# Patient Record
Sex: MALE | Race: WHITE | NOT HISPANIC OR LATINO | URBAN - METROPOLITAN AREA
[De-identification: names, ages, dates, MRNs, and addresses within clinical notes are randomized per-mention and may not be internally consistent; named-entity substitution may affect disease eponyms.]

---

## 2018-02-20 ENCOUNTER — INPATIENT (INPATIENT)
Facility: HOSPITAL | Age: 63
LOS: 2 days | Discharge: HOME | End: 2018-02-23
Attending: INTERNAL MEDICINE | Admitting: INTERNAL MEDICINE
Payer: COMMERCIAL

## 2018-02-20 VITALS
WEIGHT: 179.9 LBS | HEIGHT: 68 IN | SYSTOLIC BLOOD PRESSURE: 124 MMHG | TEMPERATURE: 100 F | OXYGEN SATURATION: 100 % | DIASTOLIC BLOOD PRESSURE: 82 MMHG | RESPIRATION RATE: 17 BRPM | HEART RATE: 78 BPM

## 2018-02-20 DIAGNOSIS — C34.11 MALIGNANT NEOPLASM OF UPPER LOBE, RIGHT BRONCHUS OR LUNG: ICD-10-CM

## 2018-02-20 DIAGNOSIS — F17.210 NICOTINE DEPENDENCE, CIGARETTES, UNCOMPLICATED: ICD-10-CM

## 2018-02-20 DIAGNOSIS — R07.89 OTHER CHEST PAIN: ICD-10-CM

## 2018-02-20 DIAGNOSIS — I10 ESSENTIAL (PRIMARY) HYPERTENSION: ICD-10-CM

## 2018-02-20 DIAGNOSIS — J43.9 EMPHYSEMA, UNSPECIFIED: ICD-10-CM

## 2018-02-20 DIAGNOSIS — J95.811 POSTPROCEDURAL PNEUMOTHORAX: ICD-10-CM

## 2018-02-20 DIAGNOSIS — N17.9 ACUTE KIDNEY FAILURE, UNSPECIFIED: ICD-10-CM

## 2018-02-20 LAB
ALBUMIN SERPL ELPH-MCNC: 3.7 G/DL — SIGNIFICANT CHANGE UP (ref 3.3–5)
ALP SERPL-CCNC: 68 U/L — SIGNIFICANT CHANGE UP (ref 40–120)
ALT FLD-CCNC: 23 U/L — SIGNIFICANT CHANGE UP (ref 12–78)
ANION GAP SERPL CALC-SCNC: 5 MMOL/L — SIGNIFICANT CHANGE UP (ref 5–17)
APTT BLD: 28.8 SEC — SIGNIFICANT CHANGE UP (ref 27.5–37.4)
AST SERPL-CCNC: 16 U/L — SIGNIFICANT CHANGE UP (ref 15–37)
BASOPHILS # BLD AUTO: 0.1 K/UL — SIGNIFICANT CHANGE UP (ref 0–0.2)
BASOPHILS NFR BLD AUTO: 0.8 % — SIGNIFICANT CHANGE UP (ref 0–2)
BILIRUB SERPL-MCNC: 0.2 MG/DL — SIGNIFICANT CHANGE UP (ref 0.2–1.2)
BUN SERPL-MCNC: 19 MG/DL — SIGNIFICANT CHANGE UP (ref 7–23)
CALCIUM SERPL-MCNC: 9.3 MG/DL — SIGNIFICANT CHANGE UP (ref 8.5–10.1)
CHLORIDE SERPL-SCNC: 102 MMOL/L — SIGNIFICANT CHANGE UP (ref 96–108)
CK SERPL-CCNC: 67 U/L — SIGNIFICANT CHANGE UP (ref 26–308)
CO2 SERPL-SCNC: 33 MMOL/L — HIGH (ref 22–31)
CREAT SERPL-MCNC: 1.35 MG/DL — HIGH (ref 0.5–1.3)
D DIMER BLD IA.RAPID-MCNC: 208 NG/ML DDU — SIGNIFICANT CHANGE UP
EOSINOPHIL # BLD AUTO: 0.2 K/UL — SIGNIFICANT CHANGE UP (ref 0–0.5)
EOSINOPHIL NFR BLD AUTO: 1.8 % — SIGNIFICANT CHANGE UP (ref 0–6)
GLUCOSE SERPL-MCNC: 99 MG/DL — SIGNIFICANT CHANGE UP (ref 70–99)
HCT VFR BLD CALC: 39.5 % — SIGNIFICANT CHANGE UP (ref 39–50)
HGB BLD-MCNC: 13.2 G/DL — SIGNIFICANT CHANGE UP (ref 13–17)
INR BLD: 1.17 RATIO — HIGH (ref 0.88–1.16)
LYMPHOCYTES # BLD AUTO: 2.5 K/UL — SIGNIFICANT CHANGE UP (ref 1–3.3)
LYMPHOCYTES # BLD AUTO: 24.6 % — SIGNIFICANT CHANGE UP (ref 13–44)
MCHC RBC-ENTMCNC: 29.4 PG — SIGNIFICANT CHANGE UP (ref 27–34)
MCHC RBC-ENTMCNC: 33.4 GM/DL — SIGNIFICANT CHANGE UP (ref 32–36)
MCV RBC AUTO: 87.9 FL — SIGNIFICANT CHANGE UP (ref 80–100)
MONOCYTES # BLD AUTO: 0.9 K/UL — SIGNIFICANT CHANGE UP (ref 0–0.9)
MONOCYTES NFR BLD AUTO: 8.6 % — SIGNIFICANT CHANGE UP (ref 2–14)
NEUTROPHILS # BLD AUTO: 6.6 K/UL — SIGNIFICANT CHANGE UP (ref 1.8–7.4)
NEUTROPHILS NFR BLD AUTO: 64.2 % — SIGNIFICANT CHANGE UP (ref 43–77)
PLATELET # BLD AUTO: 307 K/UL — SIGNIFICANT CHANGE UP (ref 150–400)
POTASSIUM SERPL-MCNC: 4.4 MMOL/L — SIGNIFICANT CHANGE UP (ref 3.5–5.3)
POTASSIUM SERPL-SCNC: 4.4 MMOL/L — SIGNIFICANT CHANGE UP (ref 3.5–5.3)
PROT SERPL-MCNC: 7.2 GM/DL — SIGNIFICANT CHANGE UP (ref 6–8.3)
PROTHROM AB SERPL-ACNC: 12.7 SEC — SIGNIFICANT CHANGE UP (ref 9.8–12.7)
RBC # BLD: 4.5 M/UL — SIGNIFICANT CHANGE UP (ref 4.2–5.8)
RBC # FLD: 12.6 % — SIGNIFICANT CHANGE UP (ref 10.3–14.5)
SODIUM SERPL-SCNC: 140 MMOL/L — SIGNIFICANT CHANGE UP (ref 135–145)
TROPONIN I SERPL-MCNC: <0.015 NG/ML — SIGNIFICANT CHANGE UP (ref 0.01–0.04)
WBC # BLD: 10.3 K/UL — SIGNIFICANT CHANGE UP (ref 3.8–10.5)
WBC # FLD AUTO: 10.3 K/UL — SIGNIFICANT CHANGE UP (ref 3.8–10.5)

## 2018-02-20 PROCEDURE — 93306 TTE W/DOPPLER COMPLETE: CPT | Mod: 26

## 2018-02-20 PROCEDURE — 71250 CT THORAX DX C-: CPT | Mod: 26

## 2018-02-20 PROCEDURE — 71045 X-RAY EXAM CHEST 1 VIEW: CPT | Mod: 26

## 2018-02-20 PROCEDURE — 93010 ELECTROCARDIOGRAM REPORT: CPT

## 2018-02-20 PROCEDURE — 99285 EMERGENCY DEPT VISIT HI MDM: CPT | Mod: 25

## 2018-02-20 RX ORDER — ONDANSETRON 8 MG/1
4 TABLET, FILM COATED ORAL EVERY 6 HOURS
Qty: 0 | Refills: 0 | Status: DISCONTINUED | OUTPATIENT
Start: 2018-02-20 | End: 2018-02-23

## 2018-02-20 RX ORDER — METHOCARBAMOL 500 MG/1
1000 TABLET, FILM COATED ORAL ONCE
Qty: 0 | Refills: 0 | Status: COMPLETED | OUTPATIENT
Start: 2018-02-20 | End: 2018-02-20

## 2018-02-20 RX ORDER — METOPROLOL TARTRATE 50 MG
1 TABLET ORAL
Qty: 0 | Refills: 0 | COMMUNITY

## 2018-02-20 RX ORDER — AMLODIPINE BESYLATE 2.5 MG/1
1 TABLET ORAL
Qty: 0 | Refills: 0 | COMMUNITY

## 2018-02-20 RX ORDER — METOPROLOL TARTRATE 50 MG
100 TABLET ORAL AT BEDTIME
Qty: 0 | Refills: 0 | Status: DISCONTINUED | OUTPATIENT
Start: 2018-02-20 | End: 2018-02-23

## 2018-02-20 RX ORDER — LOSARTAN POTASSIUM 100 MG/1
1 TABLET, FILM COATED ORAL
Qty: 0 | Refills: 0 | COMMUNITY

## 2018-02-20 RX ORDER — SENNA PLUS 8.6 MG/1
2 TABLET ORAL AT BEDTIME
Qty: 0 | Refills: 0 | Status: DISCONTINUED | OUTPATIENT
Start: 2018-02-20 | End: 2018-02-23

## 2018-02-20 RX ORDER — ACETAMINOPHEN 500 MG
1000 TABLET ORAL ONCE
Qty: 0 | Refills: 0 | Status: COMPLETED | OUTPATIENT
Start: 2018-02-20 | End: 2018-02-20

## 2018-02-20 RX ORDER — SODIUM CHLORIDE 9 MG/ML
3 INJECTION INTRAMUSCULAR; INTRAVENOUS; SUBCUTANEOUS ONCE
Qty: 0 | Refills: 0 | Status: COMPLETED | OUTPATIENT
Start: 2018-02-20 | End: 2018-02-20

## 2018-02-20 RX ORDER — ACETAMINOPHEN 500 MG
325 TABLET ORAL EVERY 4 HOURS
Qty: 0 | Refills: 0 | Status: DISCONTINUED | OUTPATIENT
Start: 2018-02-20 | End: 2018-02-23

## 2018-02-20 RX ORDER — ENOXAPARIN SODIUM 100 MG/ML
40 INJECTION SUBCUTANEOUS EVERY 24 HOURS
Qty: 0 | Refills: 0 | Status: DISCONTINUED | OUTPATIENT
Start: 2018-02-20 | End: 2018-02-21

## 2018-02-20 RX ORDER — DOCUSATE SODIUM 100 MG
100 CAPSULE ORAL
Qty: 0 | Refills: 0 | Status: DISCONTINUED | OUTPATIENT
Start: 2018-02-20 | End: 2018-02-23

## 2018-02-20 RX ORDER — KETOROLAC TROMETHAMINE 30 MG/ML
30 SYRINGE (ML) INJECTION ONCE
Qty: 0 | Refills: 0 | Status: DISCONTINUED | OUTPATIENT
Start: 2018-02-20 | End: 2018-02-20

## 2018-02-20 RX ADMIN — Medication 100 MILLIGRAM(S): at 18:53

## 2018-02-20 RX ADMIN — Medication 325 MILLIGRAM(S): at 21:40

## 2018-02-20 RX ADMIN — Medication 30 MILLIGRAM(S): at 01:12

## 2018-02-20 RX ADMIN — METHOCARBAMOL 220 MILLIGRAM(S): 500 TABLET, FILM COATED ORAL at 06:14

## 2018-02-20 RX ADMIN — ENOXAPARIN SODIUM 40 MILLIGRAM(S): 100 INJECTION SUBCUTANEOUS at 12:43

## 2018-02-20 RX ADMIN — Medication 100 MILLIGRAM(S): at 21:40

## 2018-02-20 RX ADMIN — Medication 30 MILLIGRAM(S): at 12:26

## 2018-02-20 RX ADMIN — Medication 1000 MILLIGRAM(S): at 07:49

## 2018-02-20 RX ADMIN — Medication 325 MILLIGRAM(S): at 12:43

## 2018-02-20 RX ADMIN — SODIUM CHLORIDE 3 MILLILITER(S): 9 INJECTION INTRAMUSCULAR; INTRAVENOUS; SUBCUTANEOUS at 01:12

## 2018-02-20 NOTE — H&P ADULT - NSHPLABSRESULTS_GEN_ALL_CORE
20 Feb 2018 01:02    140    |  102    |  19     ----------------------------<  99     4.4     |  33     |  1.35     Ca    9.3        20 Feb 2018 01:02    TPro  7.2    /  Alb  3.7    /  TBili  0.2    /  DBili  x      /  AST  16     /  ALT  23     /  AlkPhos  68     20 Feb 2018 01:02  LIVER FUNCTIONS - ( 20 Feb 2018 01:02 )  Alb: 3.7 g/dL / Pro: 7.2 gm/dL / ALK PHOS: 68 U/L / ALT: 23 U/L / AST: 16 U/L / GGT: x         PT/INR - ( 20 Feb 2018 01:02 )   PT: 12.7 sec;   INR: 1.17 ratio         PTT - ( 20 Feb 2018 01:02 )  PTT:28.8 secCBC Full  -  ( 20 Feb 2018 01:02 )  WBC Count : 10.3 K/uL  Hemoglobin : 13.2 g/dL  Hematocrit : 39.5 %  Platelet Count - Automated : 307 K/uL  Mean Cell Volume : 87.9 fl  Mean Cell Hemoglobin : 29.4 pg  Mean Cell Hemoglobin Concentration : 33.4 gm/dL  Auto Neutrophil # : 6.6 K/uL  Auto Lymphocyte # : 2.5 K/uL  Auto Monocyte # : 0.9 K/uL  Auto Eosinophil # : 0.2 K/uL  Auto Basophil # : 0.1 K/uL  Auto Neutrophil % : 64.2 %  Auto Lymphocyte % : 24.6 %  Auto Monocyte % : 8.6 %  Auto Eosinophil % : 1.8 %  Auto Basophil % : 0.8 %  CARDIAC MARKERS ( 20 Feb 2018 10:00 )  <0.015 ng/mL / x     / 67 U/L / x     / x      CARDIAC MARKERS ( 20 Feb 2018 04:41 )  <0.015 ng/mL / x     / x     / x     / x      CARDIAC MARKERS ( 20 Feb 2018 01:02 )  <0.015 ng/mL / x     / x     / x     / x

## 2018-02-20 NOTE — H&P ADULT - NSHPPHYSICALEXAM_GEN_ALL_CORE
Vital Signs Last 24 Hrs  T(C): 37.8 (20 Feb 2018 06:54), Max: 37.8 (20 Feb 2018 06:54)  T(F): 100 (20 Feb 2018 06:54), Max: 100 (20 Feb 2018 06:54)  HR: 73 (20 Feb 2018 06:54) (60 - 78)  BP: 135/77 (20 Feb 2018 06:54) (109/65 - 150/74)  BP(mean): --  RR: 18 (20 Feb 2018 06:54) (17 - 18)  SpO2: 100% (20 Feb 2018 06:54) (100% - 100%)    HEENT:   pupils equal and reactive, EOMI, no oropharyngeal lesions, erythema, exudates, oral thrush    NECK:   supple, no carotid bruits, no palpable lymph nodes, no thyromegaly    CV:  +S1, +S2, regular, no murmurs or rubs    RESP:   lungs clear to auscultation bilaterally, no wheezing, rales, rhonchi, good air entry bilaterally    BREAST:  not examined    GI:  abdomen soft, non-tender, non-distended, normal BS, no bruits, no abdominal masses, no palpable masses    RECTAL:  not examined    :  not examined    MSK:   normal muscle tone, no atrophy, no rigidity, no contractions    EXT:   no clubbing, no cyanosis, no edema, no calf pain, swelling or erythema    VASCULAR:  pulses equal and symmetric in the upper and lower extremities    NEURO:  AAOX3, no focal neurological deficits, follows all commands, able to move extremities spontaneously    SKIN:  no ulcers, lesions or rashes

## 2018-02-20 NOTE — ED ADULT NURSE NOTE - OBJECTIVE STATEMENT
pt BIB EMS for chest pain. pt reports he was sitting on his couch at approximately 2100 and had an onset of chest pain. pt describes pain and constant. 4 baby asa and 1 nitro given via EMS en route to hospital. pt denies n/v/d and dizziness. no SOB or distress noted. pt taken off nonrebreather placed by EMS and provided with 2L O2 via NC for comfort. no other complaints at this time. EKG performed. Tele monitoring initiated. 18 g placed to right AC. labs sent. 18g in place from EMS to left FA

## 2018-02-20 NOTE — ED ADULT NURSE REASSESSMENT NOTE - NS ED NURSE REASSESS COMMENT FT1
pt resting in stretcher at this time. NAD. VSS. awaiting second CE. wife at bedside. pt remains safe. will continue to monitor
pt resting in stretcher at this time. pt reports decrease in pain, but not full resolution. MD Morgan aware. no other complaints at this time.
robaxin infusion started at this time for chest pain. no other complaints at this time. pt remains safe. will continue to monitor.
Received report from ED NATHEN Guillermo

## 2018-02-20 NOTE — ED ADULT TRIAGE NOTE - CHIEF COMPLAINT QUOTE
Pt presents with chest pain that began at approx 9Pm on 2/19/18. Pt took 4 baby ASA PTA and was given nitro in route by EMS.

## 2018-02-20 NOTE — H&P ADULT - HISTORY OF PRESENT ILLNESS
62 year old male w/ PMH of >30 year 1PPD smoking hx, HTN, who comes to the Ed complaining of chest pain 2 hours. As per patient pain started 2 hours before comming to ER, and has been consistent and ongoing since arrival. He states he is still having the chest pain. He describes the pain as sharp pain, on the left side of his chest, along the entire sternal border and neck.  He does  not have any history of acid reflux. Patient states the pain is worse with deep inspiration. No association with movmeent. He admits to feeling low grade fevers. He denies any nausea, vommitting, diahrea,. 62 year old male w/ PMH of >30 year 1PPD smoking hx, HTN, who comes to the Ed complaining of chest pain 2 hours. As per patient pain started 2 hours before comming to ER, and has been consistent and ongoing since arrival. He states he is still having the chest pain. He describes the pain as sharp pain, on the left side of his chest, along the entire sternal border and neck.  He does  not have any history of acid reflux. Patient states the pain is worse with deep inspiration. No association with movmeent. He has had no recent travel and has no personal or family hx of DVT/ or PE. He admits to feeling low grade fevers.

## 2018-02-20 NOTE — CONSULT NOTE ADULT - SUBJECTIVE AND OBJECTIVE BOX
Patient is a 62y old  Male who presents with a chief complaint of chest pain.      HPI:  62 year old male w/ PMH of >30 year 1PPD smoking hx, HTN, who comes to the Ed complaining of chest pain 2 hours. As per patient pain started 2 hours before comming to ER, and has been consistent and ongoing since arrival. He states he is still having the chest pain. He describes the pain as sharp and achy in nature, on the left side of his chest, starting from his sternal notch down to his left chest.  He does  not have any history of acid reflux. Patient states the pain is worse with deep inspiration and moving from side to side in bed. The pain was relieved for 2 hrs after muscle relaxant in ER as well as after receiving analgesic IV per pt. No association with movement. He has had no recent travel and has no personal or family hx of DVT/ or PE. He admits to feeling low grade fevers.  Denies any recent injuries or strenuous exertion.    PAST MEDICAL & SURGICAL HISTORY:      MEDICATIONS  (STANDING):  enoxaparin Injectable 40 milliGRAM(s) SubCutaneous every 24 hours    MEDICATIONS  (PRN):  acetaminophen   Tablet 325 milliGRAM(s) Oral every 4 hours PRN For Temp greater than 38 C (100.4 F) or mild pain  ondansetron Injectable 4 milliGRAM(s) IV Push every 6 hours PRN Nausea      FAMILY HISTORY:  No family history of premature CAD or SCD.       SOCIAL HISTORY:  Active smoker half pack per day , no alcohol use     REVIEW OF SYSTEMS:  CONSTITUTIONAL:  No night sweats.  No fatigue, malaise, lethargy.  No fever or chills.  HEENT:  Eyes:  No visual changes.  No eye pain.      ENT:  No runny nose.  No epistaxis.  No sinus pain.  No sore throat.  No odynophagia.  No ear pain.  No congestion.  RESPIRATORY:  No cough.  No wheeze.  No hemoptysis.  No shortness of breath.  CARDIOVASCULAR:  c/o chest pains.  No palpitations. No shortness of breath, No orthopnea or PND.  GASTROINTESTINAL:  No abdominal pain.  No nausea or vomiting.  No diarrhea or constipation.  No hematemesis.  No hematochezia.  No melena.  GENITOURINARY:  No urgency.  No frequency.  No dysuria.  No hematuria.  No obstructive symptoms.  No discharge.  No pain.  No significant abnormal bleeding.  MUSCULOSKELETAL:  No musculoskeletal pain.  No joint swelling.  No arthritis.  NEUROLOGICAL:  No tingling or numbness or weakness.  PSYCHIATRIC:  No confusion  SKIN:  No rashes.  No lesions.  No wounds.  ENDOCRINE:  No unexplained weight loss.  No polydipsia.  No polyuria.  No polyphagia.  HEMATOLOGIC:  No anemia.  No purpura.  No petechiae.  No prolonged or excessive bleeding.   ALLERGIC AND IMMUNOLOGIC:  No pruritus.  No swelling.         Vital Signs Last 24 Hrs  T(C): 37.5 (20 Feb 2018 12:20), Max: 37.8 (20 Feb 2018 06:54)  T(F): 99.5 (20 Feb 2018 12:20), Max: 100 (20 Feb 2018 06:54)  HR: 69 (20 Feb 2018 12:20) (60 - 78)  BP: 108/66 (20 Feb 2018 12:20) (108/66 - 150/74)  BP(mean): --  RR: 16 (20 Feb 2018 12:20) (14 - 18)  SpO2: 99% (20 Feb 2018 12:20) (99% - 100%)    PHYSICAL EXAM-    Constitutional: The patient appears to be normal, well developed, well nourished and alert and oriented to time, place and person. The patient does not appear acutely ill.     Head: Head is normocephalic and atraumatic.      Neck: The patient's neck is supple without enlargement, has no palpable thyromegaly nor thyroid nodules and has no jugular venous distention. No audible carotid bruits. There are strong carotid pulses bilaterally. No JVD.     Cardiovascular: Regular rate and rhythm without S3, S4. No murmurs or rubs are appreciated.      Respiratory: Breath sounds are normal. No rales. No wheezing.    Abdomen: Soft, nontender, nondistended with positive bowel sounds.      Extremity: No tenderness. No  pitting edema No skin discoloration No clubbing No cyanosis.     Neurologic: The patient is alert and oriented.      Skin: No rash, no obvious lesions noted.      Psychiatric: The patient appears to be emotionally stable.      INTERPRETATION OF TELEMETRY: sinus rythm    ECG: sinus rythm, L axis, t wave inversion in III, poor R wave progression.     I&O's Detail      LABS:                        13.2   10.3  )-----------( 307      ( 20 Feb 2018 01:02 )             39.5     02-20    140  |  102  |  19  ----------------------------<  99  4.4   |  33<H>  |  1.35<H>    Ca    9.3      20 Feb 2018 01:02    TPro  7.2  /  Alb  3.7  /  TBili  0.2  /  DBili  x   /  AST  16  /  ALT  23  /  AlkPhos  68  02-20    CARDIAC MARKERS ( 20 Feb 2018 10:00 )  <0.015 ng/mL / x     / 67 U/L / x     / x      CARDIAC MARKERS ( 20 Feb 2018 04:41 )  <0.015 ng/mL / x     / x     / x     / x      CARDIAC MARKERS ( 20 Feb 2018 01:02 )  <0.015 ng/mL / x     / x     / x     / x          PT/INR - ( 20 Feb 2018 01:02 )   PT: 12.7 sec;   INR: 1.17 ratio         PTT - ( 20 Feb 2018 01:02 )  PTT:28.8 sec    I&O's Summary    BNP  RADIOLOGY & ADDITIONAL STUDIES:  < from: CT Chest No Cont (02.20.18 @ 13:21) >  EXAM:  CT CHEST                            PROCEDURE DATE:  02/20/2018          INTERPRETATION:  Exam Date: 2/20/2018 1:21 PM  Clinical Information: Chest pain, abnormal chest x-ray  Technique:       CT of the chest was performed with axial images obtained   from the thoracic to the inlet to the bilateral adrenal glands       without IV contrast. Maximum intensity projection images were obtained.  Comparison:  Chest x-ray same day    FINDINGS:    Lungs, Airways and Pleura: Central tracheobronchial tree is grossly   patent. No endobronchial lesions. Mild bronchiectasis. Right apical mass   measuring 5.0 x 4.3 cm compatible with malignancy till proven otherwise.   Mild centrilobular and paraseptal emphysema. No pneumothorax. No palmar   edema. Bibasilar atelectasis left greater than right.    Heart and Great Vessels: Atherosclerotic changes of the aorta and   coronary vasculature. Heart is normal in size. No pericardial effusions.    Mediastinum and Hemalatha: Subcentimeter prevascular pretracheal and   subcarinal lymph nodes. Subcentimeter right hilar lymph nodes.    Neck and Chest Wall: within normal limits    Bones: Degenerative changes.    Upper Abdomen:  Moderate left renal atrophy. Left upper pole renal cyst.    IMPRESSION:         5cm right apical mass compatible with neoplasm till proven otherwise                JERSEY HECTOR M.D., ATTENDING RADIOLOGIST  This document has been electronically signed. Feb 20 2018  1:38PM

## 2018-02-20 NOTE — H&P ADULT - ASSESSMENT
62 year old male w/ PMH of >30 year 1PPD smoking hx, HTN, who comes to the Ed complaining of chest pain 2 hours. As per patient pain started 2 hours before comming to ER, and has been consistent and ongoing since arrival. He states he is still having the chest pain. He describes the pain as sharp pain, on the left side of his chest, along the entire sternal border and neck.  He does  not have any history of acid reflux. Patient states the pain is worse with deep inspiration. No association with movmeent. He admits to feeling low grade fevers. He denies any nausea, vommitting, diahrea,. 62 year old male w/ PMH of >30 year 1PPD smoking hx, HTN, who comes to the Ed complaining of chest pain.  As per patient pain started 2 hours before comming to ER, and has been consistent and ongoing since arrival. He states he is still having the chest pain. He describes the pain as sharp pain, on the left side of his chest, along the entire sternal border and neck.  He does  not have any history of acid reflux. Patient states the pain is worse with deep inspiration. No association with movmeent. He has had no recent travel and has no personal or family hx of DVT/ or PE. He admits to feeling low grade fevers.   Found to have EkG with twave inversions in lead III    1-Atypical Chest pain in a patient with high risk factors  -troponins x 3 negative  -EKG with new twave inversions in lead III  -patient is a greater then 30 year cig smoker with high blood pressure  and puts him at high risk for CAD  -will consult his cardioligst Dr. Carcamo   -Echo  -D-Dimer is negative x 3  -troponins are negative x 3  -chest xray looks clear    2-long standing tabacoo use and dependence  -educated    3-dvt proph- sc lovenox 62 year old male w/ PMH of >30 year 1PPD smoking hx, HTN, who comes to the Ed complaining of chest pain.  As per patient pain started 2 hours before comming to ER, and has been consistent and ongoing since arrival. He states he is still having the chest pain. He describes the pain as sharp pain, on the left side of his chest, along the entire sternal border and neck.  He does  not have any history of acid reflux. Patient states the pain is worse with deep inspiration. No association with movmeent. He has had no recent travel and has no personal or family hx of DVT/ or PE. He admits to feeling low grade fevers.   Found to have EkG with twave inversions in lead III  Xray shows suspicious area on right apex of lung    1-Atypical Chest pain in a patient with high risk factors  -troponins x 3 negative  -EKG with new twave inversions in lead III  -patient is a greater then 30 year cig smoker with high blood pressure  and puts him at high risk for CAD  -will consult his cardioligst Dr. Carcamo   -Echo  -D-Dimer is negative x 3  -troponins are negative x 3  -chest xray looks clear    2-long standing tabacoo use and dependence  -educated    3-right apical lung mass in patient with long standing history of smoking  -confirmed on CT chest  -pulmonary consult ordered  -will order CT ab/pelvis for staging purposes    4-dvt proph- sc lovenox

## 2018-02-20 NOTE — CONSULT NOTE ADULT - ASSESSMENT
Chest pain - atypical in nature.  Musculoskeletal in nature likely.  I reviewed his 2 D echocardiogram which did not reveal any wall motion abnormalities and his LVEF is noted to be normal.  So far cardiac enzymes and EKG did not reveal any ischemia.   Outpt ischemic heart disease evaluation recommended.  f/u with cardiologist as outpt.     Lung mass- Management pre primary team.   He will need evaluation for this.     HTN- continue current meds.    Tobacco abuse- advised him to quit smoking. Advised about the risks of smoking at length with the patient.     Other medical issues- Management per primary team.   Thank you for allowing me to participate in the care of this patient. Please feel free to contact me with any questions.

## 2018-02-20 NOTE — ED PROVIDER NOTE - OBJECTIVE STATEMENT
PT is a 62 year old male who comes to the Ed complaining of chest pain 2 hours PTA. Pt with pain described as sharp and radiating to the left shoulder. Pt states TTP of the left chest wall. No dyspnea. Pt states the pain feels like muscle but is not sure so came for eval. Pt does have hx of HTN and sees cardio. Last was 1 yr ago and all was normal. Pt is a smoker 1/2 ppd. No direct trauma to the chest wall.

## 2018-02-21 LAB
ANION GAP SERPL CALC-SCNC: 6 MMOL/L — SIGNIFICANT CHANGE UP (ref 5–17)
BASOPHILS # BLD AUTO: 0.1 K/UL — SIGNIFICANT CHANGE UP (ref 0–0.2)
BASOPHILS NFR BLD AUTO: 1 % — SIGNIFICANT CHANGE UP (ref 0–2)
BUN SERPL-MCNC: 16 MG/DL — SIGNIFICANT CHANGE UP (ref 7–23)
CALCIUM SERPL-MCNC: 8.6 MG/DL — SIGNIFICANT CHANGE UP (ref 8.5–10.1)
CHLORIDE SERPL-SCNC: 106 MMOL/L — SIGNIFICANT CHANGE UP (ref 96–108)
CO2 SERPL-SCNC: 27 MMOL/L — SIGNIFICANT CHANGE UP (ref 22–31)
CREAT SERPL-MCNC: 0.94 MG/DL — SIGNIFICANT CHANGE UP (ref 0.5–1.3)
EOSINOPHIL # BLD AUTO: 0.1 K/UL — SIGNIFICANT CHANGE UP (ref 0–0.5)
EOSINOPHIL NFR BLD AUTO: 0.8 % — SIGNIFICANT CHANGE UP (ref 0–6)
GLUCOSE SERPL-MCNC: 102 MG/DL — HIGH (ref 70–99)
HCT VFR BLD CALC: 35.6 % — LOW (ref 39–50)
HGB BLD-MCNC: 11.8 G/DL — LOW (ref 13–17)
LYMPHOCYTES # BLD AUTO: 2.3 K/UL — SIGNIFICANT CHANGE UP (ref 1–3.3)
LYMPHOCYTES # BLD AUTO: 23.7 % — SIGNIFICANT CHANGE UP (ref 13–44)
MAGNESIUM SERPL-MCNC: 2.1 MG/DL — SIGNIFICANT CHANGE UP (ref 1.6–2.6)
MCHC RBC-ENTMCNC: 29.3 PG — SIGNIFICANT CHANGE UP (ref 27–34)
MCHC RBC-ENTMCNC: 33.2 GM/DL — SIGNIFICANT CHANGE UP (ref 32–36)
MCV RBC AUTO: 88.1 FL — SIGNIFICANT CHANGE UP (ref 80–100)
MONOCYTES # BLD AUTO: 1.2 K/UL — HIGH (ref 0–0.9)
MONOCYTES NFR BLD AUTO: 12.5 % — SIGNIFICANT CHANGE UP (ref 2–14)
NEUTROPHILS # BLD AUTO: 6 K/UL — SIGNIFICANT CHANGE UP (ref 1.8–7.4)
NEUTROPHILS NFR BLD AUTO: 62 % — SIGNIFICANT CHANGE UP (ref 43–77)
PHOSPHATE SERPL-MCNC: 2.1 MG/DL — LOW (ref 2.5–4.5)
PLATELET # BLD AUTO: 259 K/UL — SIGNIFICANT CHANGE UP (ref 150–400)
POTASSIUM SERPL-MCNC: 3.9 MMOL/L — SIGNIFICANT CHANGE UP (ref 3.5–5.3)
POTASSIUM SERPL-SCNC: 3.9 MMOL/L — SIGNIFICANT CHANGE UP (ref 3.5–5.3)
RBC # BLD: 4.04 M/UL — LOW (ref 4.2–5.8)
RBC # FLD: 12.6 % — SIGNIFICANT CHANGE UP (ref 10.3–14.5)
SODIUM SERPL-SCNC: 139 MMOL/L — SIGNIFICANT CHANGE UP (ref 135–145)
WBC # BLD: 9.6 K/UL — SIGNIFICANT CHANGE UP (ref 3.8–10.5)
WBC # FLD AUTO: 9.6 K/UL — SIGNIFICANT CHANGE UP (ref 3.8–10.5)

## 2018-02-21 PROCEDURE — 74176 CT ABD & PELVIS W/O CONTRAST: CPT | Mod: 26

## 2018-02-21 RX ORDER — LOSARTAN POTASSIUM 100 MG/1
100 TABLET, FILM COATED ORAL DAILY
Qty: 0 | Refills: 0 | Status: DISCONTINUED | OUTPATIENT
Start: 2018-02-21 | End: 2018-02-23

## 2018-02-21 RX ORDER — AMLODIPINE BESYLATE 2.5 MG/1
2.5 TABLET ORAL DAILY
Qty: 0 | Refills: 0 | Status: DISCONTINUED | OUTPATIENT
Start: 2018-02-21 | End: 2018-02-23

## 2018-02-21 RX ADMIN — AMLODIPINE BESYLATE 2.5 MILLIGRAM(S): 2.5 TABLET ORAL at 09:49

## 2018-02-21 RX ADMIN — Medication 100 MILLIGRAM(S): at 21:39

## 2018-02-21 RX ADMIN — Medication 100 MILLIGRAM(S): at 17:36

## 2018-02-21 RX ADMIN — LOSARTAN POTASSIUM 100 MILLIGRAM(S): 100 TABLET, FILM COATED ORAL at 09:49

## 2018-02-21 RX ADMIN — Medication 325 MILLIGRAM(S): at 21:40

## 2018-02-21 RX ADMIN — Medication 325 MILLIGRAM(S): at 06:04

## 2018-02-21 NOTE — PROGRESS NOTE ADULT - SUBJECTIVE AND OBJECTIVE BOX
Progress Note:   · Provider Specialty	Hospitalist	      · Subjective and Objective: 	    CHIEF COMPLAINT: chest pain    SUBJECTIVE: feels better, no pain, so cough, no sob. plan for IR biopsy tomorrow then home to f/u result. some low grade temps. No dysuria.    REVIEW OF SYSTEMS: All other review of systems is negative unless indicated above    Vital Signs Last 24 Hrs  T(C): 37.1 (21 Feb 2018 09:48), Max: 37.9 (21 Feb 2018 05:47)  T(F): 98.7 (21 Feb 2018 09:48), Max: 100.2 (21 Feb 2018 05:47)  HR: 66 (21 Feb 2018 17:04) (63 - 78)  BP: 139/79 (21 Feb 2018 17:04) (125/70 - 145/72)  BP(mean): --  RR: 18 (21 Feb 2018 17:04) (16 - 18)  SpO2: 99% (21 Feb 2018 17:04) (95% - 99%)      HEENT:   pupils equal and reactive, EOMI, no oropharyngeal lesions, erythema, exudates, oral thrush  	NECK:   supple, no carotid bruits, no palpable lymph nodes, no thyromegaly  	CV:  +S1, +S2, regular, no murmurs or rubs  	RESP:   lungs clear to auscultation bilaterally, no wheezing, rales, rhonchi, good air entry bilaterally  	BREAST:  not examined  	GI:  abdomen soft, non-tender, non-distended, normal BS, no bruits, no abdominal masses, no palpable masses  	RECTAL:  not examined  	:  not examined  	MSK:   normal muscle tone, no atrophy, no rigidity, no contractions  	EXT:   no clubbing, no cyanosis, no edema, no calf pain, swelling or erythema  	VASCULAR:  pulses equal and symmetric in the upper and lower extremities  	NEURO:  AAOX3, no focal neurological deficits, follows all commands, able to move extremities spontaneously    SKIN:  no ulcers, lesions or rashes    MEDICATIONS:  MEDICATIONS  (STANDING):  amLODIPine   Tablet 2.5 milliGRAM(s) Oral daily  docusate sodium 100 milliGRAM(s) Oral two times a day  losartan 100 milliGRAM(s) Oral daily  metoprolol succinate  milliGRAM(s) Oral at bedtime    LABS: All Labs Reviewed:                        11.8   9.6   )-----------( 259      ( 21 Feb 2018 06:55 )             35.6     139  |  106  |  16  ----------------------------<  102<H>  3.9   |  27  |  0.94    Ca    8.6      21 Feb 2018 06:55  Phos  2.1     02-21  Mg     2.1     02-21    TPro  7.2  /  Alb  3.7  /  TBili  0.2  /  DBili  x   /  AST  16  /  ALT  23  /  AlkPhos  68  02-20    PT/INR - ( 20 Feb 2018 01:02 )   PT: 12.7 sec;   INR: 1.17 ratio   PTT - ( 20 Feb 2018 01:02 )  PTT:28.8 sec  CARDIAC MARKERS ( 20 Feb 2018 10:00 )  <0.015 ng/mL / x     / 67 U/L / x     / x      CARDIAC MARKERS ( 20 Feb 2018 04:41 )  <0.015 ng/mL / x     / x     / x     / x      CARDIAC MARKERS ( 20 Feb 2018 01:02 )  <0.015 ng/mL / x     / x     / x     / x          Assessment and Plan:   · Assessment		  62 year old male w/ PMH of >30 year 1PPD smoking hx, HTN, who comes to the Ed complaining of chest pain.  As per patient pain started 2 hours before comming to ER, and has been consistent and ongoing since arrival. He states he is still having the chest pain. He describes the pain as sharp pain, on the left side of his chest, along the entire sternal border and neck.  He does  not have any history of acid reflux. Patient states the pain is worse with deep inspiration. No association with movmeent. He has had no recent travel and has no personal or family hx of DVT/ or PE. He admits to feeling low grade fevers.   Found to have EkG with twave inversions in lead III  Xray shows suspicious area on right apex of lung    # Atypical Chest pain   -troponins x 3 negative  -EKG with new twave inversions in lead III  -cardio eval appreicated- outpt ischemic eval  -Echo normal EF no wma.  -D-Dimer is negative x 3    # long standing tabacoo use and dependence  -educated    # right apical lung mass in patient with long standing history of smoking  - Most likely malignancy. Less likely fungal.    -confirmed on CT chest  -pulmonary consult appreciated  -will order CT ab/pelvis no evidence of metastatic disease  - Plan for biopsy in IR tomorrow.  Can be dc'd home after biopsy.     # Low grade fevers  - No focal infection. bladder wall thickening on CT however pt asymptomatic, would not check UA/cx.  Fever may be related to malignancy or possibly fungal lung infection    # Emphysema on CT chest (Also not documented on the CT Chest report is a cystic or bullous lesion in the Left apex)  - outpt follow up with Dr. Denny  - asymptomatic    # dvt proph- sc lovenox      Attending Attestation:   Plan discussed with Dr. Denny.

## 2018-02-22 ENCOUNTER — RESULT REVIEW (OUTPATIENT)
Age: 63
End: 2018-02-22

## 2018-02-22 PROCEDURE — 88305 TISSUE EXAM BY PATHOLOGIST: CPT | Mod: 26

## 2018-02-22 PROCEDURE — 32405: CPT

## 2018-02-22 PROCEDURE — 32557 INSERT CATH PLEURA W/ IMAGE: CPT | Mod: RT

## 2018-02-22 PROCEDURE — 88173 CYTOPATH EVAL FNA REPORT: CPT | Mod: 26

## 2018-02-22 PROCEDURE — 71045 X-RAY EXAM CHEST 1 VIEW: CPT | Mod: 26

## 2018-02-22 PROCEDURE — 88342 IMHCHEM/IMCYTCHM 1ST ANTB: CPT | Mod: 26

## 2018-02-22 PROCEDURE — 99232 SBSQ HOSP IP/OBS MODERATE 35: CPT

## 2018-02-22 PROCEDURE — 77012 CT SCAN FOR NEEDLE BIOPSY: CPT | Mod: 26,59

## 2018-02-22 PROCEDURE — 88172 CYTP DX EVAL FNA 1ST EA SITE: CPT | Mod: 26

## 2018-02-22 RX ORDER — SODIUM CHLORIDE 9 MG/ML
1000 INJECTION INTRAMUSCULAR; INTRAVENOUS; SUBCUTANEOUS
Qty: 0 | Refills: 0 | Status: DISCONTINUED | OUTPATIENT
Start: 2018-02-22 | End: 2018-02-22

## 2018-02-22 RX ORDER — OXYCODONE HYDROCHLORIDE 5 MG/1
10 TABLET ORAL EVERY 6 HOURS
Qty: 0 | Refills: 0 | Status: DISCONTINUED | OUTPATIENT
Start: 2018-02-22 | End: 2018-02-23

## 2018-02-22 RX ORDER — FENTANYL CITRATE 50 UG/ML
25 INJECTION INTRAVENOUS
Qty: 0 | Refills: 0 | Status: DISCONTINUED | OUTPATIENT
Start: 2018-02-22 | End: 2018-02-22

## 2018-02-22 RX ORDER — ONDANSETRON 8 MG/1
4 TABLET, FILM COATED ORAL ONCE
Qty: 0 | Refills: 0 | Status: DISCONTINUED | OUTPATIENT
Start: 2018-02-22 | End: 2018-02-22

## 2018-02-22 RX ORDER — OXYCODONE HYDROCHLORIDE 5 MG/1
5 TABLET ORAL EVERY 6 HOURS
Qty: 0 | Refills: 0 | Status: DISCONTINUED | OUTPATIENT
Start: 2018-02-22 | End: 2018-02-23

## 2018-02-22 RX ADMIN — Medication 325 MILLIGRAM(S): at 13:48

## 2018-02-22 RX ADMIN — LOSARTAN POTASSIUM 100 MILLIGRAM(S): 100 TABLET, FILM COATED ORAL at 05:11

## 2018-02-22 RX ADMIN — AMLODIPINE BESYLATE 2.5 MILLIGRAM(S): 2.5 TABLET ORAL at 05:11

## 2018-02-22 NOTE — PROGRESS NOTE ADULT - ASSESSMENT
62 year old male w/ PMH of >30 year 1PPD smoking hx, HTN, admitted for:     # Atypical Chest pain   -troponins x 3 negative  -EKG with new t wave inversions in lead III  -TTE: Normal EF   - D Dimers neg   -cardio eval appreciated, plan for   outpt ischemic eval        # long standing tabacoo use and dependence  - counseled for smoking cessation     # right apical lung mass in patient with long standing history of smoking. S/p CT guided BX complicated by PTX. S/p CT placement   - Highly suspicious for  malignancy. Less likely fungal.    - CT chest reviewed.   - CXR after tube placement with apical PTX  - F/u BX results  - Monitor O2 sats, supplement O2 as needed  - CT Sx eval for chest tube  management   - Repeat CXR in am           # Low grade fever on admission x 1   - No signs of infection   - bladder wall thickening on CT however pt asymptomatic  -  Fever may be related to malignancy or possibly fungal lung infection?    # Emphysema on CT chest (Also not documented on the CT Chest report is a cystic or bullous lesion in the Left apex)  - outpt follow up with Dr. Denny, will need PFT's   - asymptomatic    # dvt proph- sc lovenox

## 2018-02-22 NOTE — PROGRESS NOTE ADULT - SUBJECTIVE AND OBJECTIVE BOX
History of Present Illness:  62 year old male w/ PMH of >30 year 1PPD smoking hx, HTN, who comes to the Ed complaining of chest pain and low grade fevers. CT chest done showed a right apical mass. Found to have EkG with t-wave inversions in lead III, had ECHO done and 3 sets of troponins- WNL. Pt to have outpt ischemic w/u. Pt went to IR 2/22/18 to have bx of lung w resultant PTX, pigtail placed. CT surgery called for management        Relevant Family History  FAMILY HISTORY:      SOCIAL HISTORY:  Smoker: x[ ] Yes  [ ] No        PACK YEARS:   30                      WHEN QUIT?  ETOH use: [ ] Yes  [ x] No              FREQUENCY / QUANTITY:  Ilicit Drug use:  [ ] Yes  [x ] No  Occupation:  Live with:  Assist device use: NO    MEDICATIONS  (STANDING):  amLODIPine   Tablet 2.5 milliGRAM(s) Oral daily  docusate sodium 100 milliGRAM(s) Oral two times a day  losartan 100 milliGRAM(s) Oral daily  metoprolol succinate  milliGRAM(s) Oral at bedtime    MEDICATIONS  (PRN):  acetaminophen   Tablet 325 milliGRAM(s) Oral every 4 hours PRN For Temp greater than 38 C (100.4 F) or mild pain  ondansetron Injectable 4 milliGRAM(s) IV Push every 6 hours PRN Nausea  senna 2 Tablet(s) Oral at bedtime PRN Constipation      Allergies: No Known Allergies                                                            LABS:                        11.8   9.6   )-----------( 259      ( 21 Feb 2018 06:55 )             35.6     02-21    139  |  106  |  16  ----------------------------<  102<H>  3.9   |  27  |  0.94    Ca    8.6      21 Feb 2018 06:55  Phos  2.1     02-21  Mg     2.1     02-21                    Review of Systems         CP, w associated SOB  ROS negative x 10 systems except as noted above    T(C): 37.3 (02-22-18 @ 05:00), Max: 37.3 (02-21-18 @ 21:49)  HR: 64 (02-22-18 @ 05:00) (64 - 81)  BP: 129/73 (02-22-18 @ 05:00) (129/73 - 139/79)  RR: 18 (02-22-18 @ 05:00) (18 - 18)  SpO2: 97% (02-22-18 @ 05:00) (97% - 99%)    Physical Exam  General: WD, WN, NAD                                                         Neuro: A+O x 3,                   Chest:   card  GI:   Extremities History of Present Illness:  62 year old male w/ PMH of >30 year 1PPD smoking hx, HTN, who comes to the Ed complaining of chest pain and low grade fevers. CT chest done showed a right apical mass. Found to have EkG with t-wave inversions in lead III, had ECHO done and 3 sets of troponins- WNL. Pt to have outpt ischemic w/u. Pt went to IR 2/22/18 to have bx of lung w resultant PTX, pigtail placed. CT surgery called for management        Relevant Family History  FAMILY HISTORY: sister lung CA, brother- CA (unsure primary, lymphoma?), mom-brain CA      SOCIAL HISTORY:  Smoker: x[ ] Yes  [ ] No        PACK YEARS:   30                      WHEN QUIT? this hospitalization  ETOH use: [ ] Yes  [ x] No              FREQUENCY / QUANTITY:  Ilicit Drug use:  [ ] Yes  [x ] No  Occupation:   Live with: wife  Assist device use: NO    MEDICATIONS  (STANDING):  amLODIPine   Tablet 2.5 milliGRAM(s) Oral daily  docusate sodium 100 milliGRAM(s) Oral two times a day  losartan 100 milliGRAM(s) Oral daily  metoprolol succinate  milliGRAM(s) Oral at bedtime    MEDICATIONS  (PRN):  acetaminophen   Tablet 325 milliGRAM(s) Oral every 4 hours PRN For Temp greater than 38 C (100.4 F) or mild pain  ondansetron Injectable 4 milliGRAM(s) IV Push every 6 hours PRN Nausea  senna 2 Tablet(s) Oral at bedtime PRN Constipation      Allergies: No Known Allergies                                                            LABS:                        11.8   9.6   )-----------( 259      ( 21 Feb 2018 06:55 )             35.6     02-21    139  |  106  |  16  ----------------------------<  102<H>  3.9   |  27  |  0.94    Ca    8.6      21 Feb 2018 06:55  Phos  2.1     02-21  Mg     2.1     02-21                    Review of Systems         CP, w associated SOB  ROS negative x 10 systems except as noted above    T(C): 37.3 (02-22-18 @ 05:00), Max: 37.3 (02-21-18 @ 21:49)  HR: 64 (02-22-18 @ 05:00) (64 - 81)  BP: 129/73 (02-22-18 @ 05:00) (129/73 - 139/79)  RR: 18 (02-22-18 @ 05:00) (18 - 18)  SpO2: 97% (02-22-18 @ 05:00) (97% - 99%)    Physical Exam  General: WD, WN, NAD                                                         Neuro: A+O x 3,                   Chest: CTA b/l, no accessory muscle use, Pigtail cath to right chest, no AL present, dressing C/D/I  card RRR  GI: NT, ND  Extremities warm, no edema

## 2018-02-22 NOTE — PROGRESS NOTE ADULT - SUBJECTIVE AND OBJECTIVE BOX
CC: chest pain (20 Feb 2018 11:00)    HPI:  62 year old male w/ PMH of >30 year 1PPD smoking hx, HTN, who comes to the Ed complaining of chest pain 2 hours. As per patient pain started 2 hours before comming to ER, and has been consistent and ongoing since arrival. He states he is still having the chest pain. He describes the pain as sharp pain, on the left side of his chest, along the entire sternal border and neck.  He does  not have any history of acid reflux. Patient states the pain is worse with deep inspiration. No association with movmeent. He has had no recent travel and has no personal or family hx of DVT/ or PE. He admits to feeling low grade fevers. (20 Feb 2018 11:00)    INTERVAL HPI/ OVERNIGHT EVENTS: Chart reviewed, Pt was seen and examined. S/p  CT guided R upper lobe Bx complicated with PTX and pigtail placement. Pt c/o some pain at site of CT but  no SOB. No fevers. POC d/w Pt and Family at bedside     Vital Signs Last 24 Hrs  T(C): 36.7 (22 Feb 2018 12:31), Max: 37.3 (21 Feb 2018 21:49)  T(F): 98.1 (22 Feb 2018 12:31), Max: 99.2 (21 Feb 2018 21:49)  HR: 55 (22 Feb 2018 12:31) (49 - 81)  BP: 139/74 (22 Feb 2018 12:31) (129/73 - 139/79)  RR: 16 (22 Feb 2018 12:31) (13 - 18)  SpO2: 99% (22 Feb 2018 12:31) (97% - 100%)          REVIEW OF SYSTEMS:  All other review of systems is negative unless indicated above.      PHYSICAL EXAM:  General: Well developed; well nourished; in no acute distress  Eyes: PERRLA, EOMI; conjunctiva and sclera clear  Head: Normocephalic; atraumatic  ENMT:  No nasal discharge; airway clear  Neck: Supple; non tender; no masses  Respiratory:  Decreased BS at R base. No wheezes, rales or rhonchi  Cardiovascular: Regular rate and rhythm. S1 and S2 Normal; No murmurs  Gastrointestinal: Soft non-tender non-distended; Normal bowel sounds  Genitourinary: No costovertebral angle tenderness  Extremities: Normal range of motion, No clubbing, cyanosis or edema  Vascular: Peripheral pulses palpable 2+ bilaterally  Neurological: Alert and oriented x4, non focal   Skin: Warm and dry. No acute rash  Lymph Nodes: No acute cervical adenopathy  Musculoskeletal: Normal muscle  tone, without deformities  Psychiatric: Cooperative and appropriate    LABS:                           11.8   9.6   )-----------( 259      ( 21 Feb 2018 06:55 )             35.6     21 Feb 2018 06:55    139    |  106    |  16     ----------------------------<  102    3.9     |  27     |  0.94     Ca    8.6        21 Feb 2018 06:55  Phos  2.1       21 Feb 2018 06:55  Mg     2.1       21 Feb 2018 06:55          MEDICATIONS  (STANDING):  amLODIPine   Tablet 2.5 milliGRAM(s) Oral daily  docusate sodium 100 milliGRAM(s) Oral two times a day  losartan 100 milliGRAM(s) Oral daily  metoprolol succinate  milliGRAM(s) Oral at bedtime    MEDICATIONS  (PRN):  acetaminophen   Tablet 325 milliGRAM(s) Oral every 4 hours PRN For Temp greater than 38 C (100.4 F) or mild pain  ondansetron Injectable 4 milliGRAM(s) IV Push every 6 hours PRN Nausea  oxyCODONE    IR 5 milliGRAM(s) Oral every 6 hours PRN Moderate Pain (4 - 6)  oxyCODONE    IR 10 milliGRAM(s) Oral every 6 hours PRN Severe Pain (7 - 10)  senna 2 Tablet(s) Oral at bedtime PRN Constipation      RADIOLOGY & ADDITIONAL TESTS:  EXAM:  XR CHEST PORTABLE IMMED 1V                        PROCEDURE DATE:  02/22/2018      Comparison: 2/20/2018    AP radiograph of the chest demonstrates 6.3 cm mass in the RIGHT upper   lobe. RIGHT pigtail catheter is in place. The cardiac silhouette is   normal in size. Osseous structures are intact.    Impression:6.3 cm mass in the RIGHT upper lobe. RIGHT pigtail catheter is   in place.          EXAM:  CT ABDOMEN AND PELVIS                        PROCEDURE DATE:  02/21/2018    COMMENTS:    LOWER LUNGS AND PLEURA: Trace pericardial effusion. Trace left pleural   effusion with minimal bibasilar atelectatic    LIVER: within normal limits.  BILE DUCTS: normal caliber.  GALLBLADDER:      no wall thickening. Gallstones are not excluded.  PANCREAS: within normal limits.  SPLEEN: within normal limits.  ADRENALS: within normal limits.    KIDNEYS, URETERS AND BLADDER: Moderate left renal atrophy. Bilateral   renal cortical scarring. Posterior left upper pole renal cyst. No   hydronephrosis bilaterally. Punctate nonobstructive right lower pole   intrarenal calculus. Mild circumferential thickening of the bladder wall,   correlate with urinalysis.  PELVIS: no pelvic masses.No pelvic lymphadenopathy.    BOWEL: The unopacified bowel is of normal course and caliber without   evidence of obstruction or bowel wall thickening. A normal appendix is   visualized.     PERITONEUM: no ascites or free air, no fluid collection.  RETROPERITONEUM: within normal limits.      VESSELS: atherosclerotic changes.      ABDOMINAL WALL: within normal limits.  BONES: Degenerative changes. Osseous bridging the bilateral anterior   sacroiliac joints. Multilevel degenerative disc disease.     IMPRESSION:     No evidence of metastatic disease in the abdomen or pelvis on this   noncontrast study.

## 2018-02-22 NOTE — PROGRESS NOTE ADULT - SUBJECTIVE AND OBJECTIVE BOX
SUBJECTIVE     patient denies any active symptoms and was kept NPO for the ct guided biopsy of the right upper lobe mass   chest resolved and has no sob           PAST MEDICAL & SURGICAL HISTORY:  HTN   NO documented history of copd or asthma in the past         OBJECTIVE       Vital Signs Last 24 Hrs  T(C): 37.3 (22 Feb 2018 05:00), Max: 37.3 (21 Feb 2018 21:49)  T(F): 99.1 (22 Feb 2018 05:00), Max: 99.2 (21 Feb 2018 21:49)  HR: 64 (22 Feb 2018 05:00) (63 - 81)  BP: 129/73 (22 Feb 2018 05:00) (125/70 - 139/79)  BP(mean): --  RR: 18 (22 Feb 2018 05:00) (16 - 18)  SpO2: 97% (22 Feb 2018 05:00) (96% - 99%)        Daily     Daily       I&O's Summary        PHYSICAL EXAM:    Constitutional: , awake and alert, not in distress.     HEENT: Normo cephalic atraumatic    Neck: Soft and supple, No J.V.D     Respiratory: vesicular breathing , No wheezing, rales or rhonchi.     Cardiovascular: S1 and S2, regular rate .     Gastrointestinal:  soft, nontender,     Extremities: No  edema or calf tenderness .    Neurological: No new  focal deficits.      Medications:  MEDICATIONS  (STANDING):  amLODIPine   Tablet 2.5 milliGRAM(s) Oral daily  docusate sodium 100 milliGRAM(s) Oral two times a day  losartan 100 milliGRAM(s) Oral daily  metoprolol succinate  milliGRAM(s) Oral at bedtime                                  11.8   9.6   )-----------( 259      ( 21 Feb 2018 06:55 )             35.6     02-21    139  |  106  |  16  ----------------------------<  102<H>  3.9   |  27  |  0.94    Ca    8.6      21 Feb 2018 06:55  Phos  2.1     02-21  Mg     2.1     02-21          < from: CT Chest No Cont (02.20.18 @ 13:21) >  FINDINGS:    Lungs, Airways and Pleura: Central tracheobronchial tree is grossly   patent. No endobronchial lesions. Mild bronchiectasis. Right apical mass   measuring 5.0 x 4.3 cm compatible with malignancy till proven otherwise.   Mild centrilobular and paraseptal emphysema. No pneumothorax. No palmar   edema. Bibasilar atelectasis left greater than right.    Heart and Great Vessels: Atherosclerotic changes of the aorta and   coronary vasculature. Heart is normal in size. No pericardial effusions.    Mediastinum and Hemalatha: Subcentimeter prevascular pretracheal and   subcarinal lymph nodes. Subcentimeter right hilar lymph nodes.    Neck and Chest Wall: within normal limits    Bones: Degenerative changes.    Upper Abdomen:  Moderate left renal atrophy. Left upper pole renal cyst.    IMPRESSION:         5cm right apical mass compatible with neoplasm till proven otherwise      < end of copied text >        < from: CT Abdomen and Pelvis No Cont (02.21.18 @ 08:44) >  LOWER LUNGS AND PLEURA: Trace pericardial effusion. Trace left pleural   effusion with minimal bibasilar atelectatic    LIVER: within normal limits.  BILE DUCTS: normal caliber.  GALLBLADDER:      no wall thickening. Gallstones are not excluded.  PANCREAS: within normal limits.  SPLEEN: within normal limits.  ADRENALS: within normal limits.    KIDNEYS, URETERS AND BLADDER: Moderate left renal atrophy. Bilateral   renal cortical scarring. Posterior left upper pole renal cyst. No   hydronephrosis bilaterally. Punctate nonobstructive right lower pole   intrarenal calculus. Mild circumferential thickening of the bladder wall,   correlate with urinalysis.  PELVIS: no pelvic masses.No pelvic lymphadenopathy.    BOWEL: The unopacified bowel is of normal course and caliber without   evidence of obstruction or bowel wall thickening. A normal appendix is   visualized.     PERITONEUM: no ascites or free air, no fluid collection.  RETROPERITONEUM: within normal limits.      VESSELS: atherosclerotic changes.      ABDOMINAL WALL: within normal limits.  BONES: Degenerative changes. Osseous bridging the bilateral anterior   sacroiliac joints. Multilevel degenerative disc disease.     IMPRESSION:     No evidence of metastatic disease in the abdomen or pelvis on this   noncontrast study.    < end of copied text >

## 2018-02-22 NOTE — BRIEF OPERATIVE NOTE - COMMENTS
1) Continue pigtail drain to suction in PACU  2) Defer further chest tube management to CT surgery. Patient Discussed with AUGIE Sevilla  3) Follow final path

## 2018-02-22 NOTE — PROGRESS NOTE ADULT - ASSESSMENT
A/P- 63 yo Male w newly found Rt upper lobe mass, s/p Bx by IR 2/22/18 w resultant PTX, s/p pigtail placement    CXR post pigtail placement w possible small apical PTX  cont pigtail to suction overnight  will check CXR in am 2/23 and place to waterseal and plan to remove 2/23/18  will D/W Dr. Sepulveda A/P- 63 yo Male w newly found Rt upper lobe mass, s/p Bx by IR 2/22/18 w resultant PTX, s/p pigtail placement    CXR post pigtail placement w apical PTX  cont pigtail to suction overnight  will check CXR in am 2/23   will D/W Dr. Sepulveda

## 2018-02-22 NOTE — BRIEF OPERATIVE NOTE - PROCEDURE
<<-----Click on this checkbox to enter Procedure Chest tube placement with CT guidance  02/22/2018    Active  BHARTI  Lung biopsy, percutaneous needle  02/22/2018    Active  PADMINIMETTA

## 2018-02-22 NOTE — BRIEF OPERATIVE NOTE - OPERATION/FINDINGS
1) Large RUL mass  2) Access under CT guidance with 17G needle  3) 18G core x 3  4) PTX on R following needle removal, expanding on delayed CT  5) 8F pigtail drain placed in R pleural space, on suction

## 2018-02-22 NOTE — PROGRESS NOTE ADULT - ASSESSMENT
- large right upper lobe mass likely primary lung cancer   - emphysema   - HTN   - Active smoker       PLAN     - will follow up ct guided biopsy results   - will need out PFT and PET scan for staging of the cancer once done   - advised to quit smoking   - further management would depend upon the biopsy results .  - discharge planning after the biopsy if no complication from the procedure

## 2018-02-23 ENCOUNTER — TRANSCRIPTION ENCOUNTER (OUTPATIENT)
Age: 63
End: 2018-02-23

## 2018-02-23 VITALS
DIASTOLIC BLOOD PRESSURE: 74 MMHG | RESPIRATION RATE: 18 BRPM | HEART RATE: 57 BPM | OXYGEN SATURATION: 100 % | SYSTOLIC BLOOD PRESSURE: 129 MMHG | TEMPERATURE: 98 F

## 2018-02-23 LAB — SURGICAL PATHOLOGY FINAL REPORT - CH: SIGNIFICANT CHANGE UP

## 2018-02-23 PROCEDURE — 71045 X-RAY EXAM CHEST 1 VIEW: CPT | Mod: 26

## 2018-02-23 PROCEDURE — 71045 X-RAY EXAM CHEST 1 VIEW: CPT | Mod: 26,77

## 2018-02-23 PROCEDURE — 99232 SBSQ HOSP IP/OBS MODERATE 35: CPT

## 2018-02-23 RX ORDER — ACETAMINOPHEN 500 MG
1 TABLET ORAL
Qty: 0 | Refills: 0 | COMMUNITY
Start: 2018-02-23

## 2018-02-23 RX ORDER — ASPIRIN/CALCIUM CARB/MAGNESIUM 324 MG
1 TABLET ORAL
Qty: 0 | Refills: 0 | COMMUNITY

## 2018-02-23 RX ORDER — OXYCODONE HYDROCHLORIDE 5 MG/1
1 TABLET ORAL
Qty: 10 | Refills: 0 | OUTPATIENT
Start: 2018-02-23 | End: 2018-02-25

## 2018-02-23 RX ADMIN — AMLODIPINE BESYLATE 2.5 MILLIGRAM(S): 2.5 TABLET ORAL at 05:39

## 2018-02-23 RX ADMIN — Medication 100 MILLIGRAM(S): at 00:01

## 2018-02-23 RX ADMIN — LOSARTAN POTASSIUM 100 MILLIGRAM(S): 100 TABLET, FILM COATED ORAL at 05:41

## 2018-02-23 RX ADMIN — Medication 325 MILLIGRAM(S): at 00:02

## 2018-02-23 NOTE — DISCHARGE NOTE ADULT - CARE PLAN
Principal Discharge DX:	Chest pain  Goal:	resolve  Assessment and plan of treatment:	c/w aspirin  f/u with cardiology for further evaluation  Secondary Diagnosis:	Lung mass  Goal:	evaluate and Tx  Assessment and plan of treatment:	f/u with Dr Denny for Biopsy results   in case od increased of SOB or CP  return to the hospital  Secondary Diagnosis:	Tobacco dependence  Goal:	cessation

## 2018-02-23 NOTE — PROGRESS NOTE ADULT - SUBJECTIVE AND OBJECTIVE BOX
SUBJECTIVE     patient has ct guided biopsy and course complicated pneumothorax and has pig tail placement with no air leak and on low suction and waiting for the follow up cxr today .     No sob or cough or wheeze           OBJECTIVE       Vital Signs Last 24 Hrs  T(C): 37 (23 Feb 2018 04:58), Max: 37.8 (23 Feb 2018 00:03)  T(F): 98.6 (23 Feb 2018 04:58), Max: 100.1 (23 Feb 2018 00:03)  HR: 60 (23 Feb 2018 05:38) (49 - 62)  BP: 131/67 (23 Feb 2018 04:58) (114/69 - 150/79)  BP(mean): --  RR: 18 (23 Feb 2018 04:58) (13 - 18)  SpO2: 98% (23 Feb 2018 04:58) (95% - 100%)            I&O's Summary    22 Feb 2018 07:01  -  23 Feb 2018 07:00  --------------------------------------------------------  IN: 150 mL / OUT: 0 mL / NET: 150 mL          PHYSICAL EXAM:    Constitutional: , awake and alert, not in distress.     HEENT: Normo cephalic atraumatic    Neck: Soft and supple, No J.V.D     Respiratory: vesicular breathing , No wheezing, rales or rhonchi. has pig tail in place with no air leak .     Cardiovascular: S1 and S2, regular rate .     Gastrointestinal:  soft, nontender,     Extremities: No  edema or calf tenderness .    Neurological: No new  focal deficits.      Medications:  MEDICATIONS  (STANDING):  amLODIPine   Tablet 2.5 milliGRAM(s) Oral daily  docusate sodium 100 milliGRAM(s) Oral two times a day  losartan 100 milliGRAM(s) Oral daily  metoprolol succinate  milliGRAM(s) Oral at bedtime

## 2018-02-23 NOTE — DISCHARGE NOTE ADULT - CARE PROVIDER_API CALL
Africa Calderon (DO), Family Medicine  [address]  [address]  [place] [address]  Phone: [phone]  Fax: [phone]    Angela Denny), Critical Care Medicine; Internal Medicine; Pulmonary Disease; Sleep Medicine  [address] [place] [address]  Phone: [phone]  Fax: [phone]    Akin Rose), Thoracic Surgery  [address] [place] [address]  Phone: [phone]  Fax: [phone]    Lucien Carcamo (KATRINA), Cardiovascular Disease; Critical Care Medicine; Internal Medicine; Interventional Cardiology  [address] [place] [address]  Phone: [phone]  Fax: [phone]

## 2018-02-23 NOTE — PROGRESS NOTE ADULT - SUBJECTIVE AND OBJECTIVE BOX
Small apical PTX noted on CXR after removal of pigtail catheter. Pt asymptomatic.   D/W Dr. Sepulveda, OK to d/c home, f/u with Dr. Rose/Coco outpt after w/u for lung mass.

## 2018-02-23 NOTE — DISCHARGE NOTE ADULT - CARE PROVIDERS DIRECT ADDRESSES
,DirectAddress_Unknown,[email or web address]

## 2018-02-23 NOTE — DISCHARGE NOTE ADULT - PATIENT PORTAL LINK FT
You can access the [facility] Portal, offered by [facility], by registering with the following website: [email or web address]

## 2018-02-23 NOTE — DISCHARGE NOTE ADULT - HOSPITAL COURSE
62 year old male w/ PMH of >30 year 1PPD smoking hx, HTN,  presented to ED complaining of chest pain/thightness started 2 hours before coming  to ER,  pain is worse with deep inspiration.  In ED had CT angio done and PE was rouled out, but noted to have emphysematous changes and  RUL mass suspicious fo malignancy. Pt had CT guided BX of the mass which complicated by small PTX, Pt has chest tube placed, was followed by CT SX. Today CT was removed, repeat CXR with small apical PTX. Pt was cleared by CTSX for d/c. Pt was seen and examined today, reports metter after tube removed, denies CP or SOB. OutPt f/u d/w Pt.     PHYSICAL EXAM:  General: Well developed; well nourished; in no acute distress  Eyes: PERRLA, EOMI; conjunctiva and sclera clear  Head: Normocephalic; atraumatic  ENMT:  No nasal discharge; airway clear  Neck: Supple; non tender; no masses  Respiratory: Good air entry b/l. No wheezes, rales or rhonchi  Cardiovascular: Regular rate and rhythm. S1 and S2 Normal; No murmurs  Gastrointestinal: Soft non-tender non-distended; Normal bowel sounds  Genitourinary: No costovertebral angle tenderness  Extremities: Normal range of motion, No clubbing, cyanosis or edema  Vascular: Peripheral pulses palpable 2+ bilaterally  Neurological: Alert and oriented x4, non focal   Skin: Warm and dry. No acute rash  Lymph Nodes: No acute cervical adenopathy  Musculoskeletal: Normal muscle  tone, without deformities  Psychiatric: Cooperative and appropriate      PLAN:   # Atypical Chest pain   -troponins x 3 negative  -EKG with new t wave inversions in lead III  -TTE: Normal EF   - D Dimers neg   - C/w ASA and BB   -cardio eval appreciated, plan for   outpt ischemic eval        # BELKYS, resolved  - likely prerenal and ACEI  - continued on ACEI   - F/u with PCP for further renal Fx monitoring     # HTN  - BP controlled: c/w metoprolol, amlodipine and losartan    # long standing tabacoo use and dependence  - counseled for smoking cessation     # right apical lung mass in patient with long standing history of smoking. S/p CT guided BX complicated by PTX. S/p CT placement/removal  Repeat CXR with small R apical PTX  - MAss  Highly suspicious for  malignancy. Less likely fungal.    - CT chest reviewed.   - O2 sats stable on RA   - D/w CT Sx team, cleared for d/c home today  - Pt will f/u with Dr denny for Bx results and further care       # Low grade fever on admission x 1   - No signs of infection   - bladder wall thickening on CT however pt asymptomatic  -  Fever may be related to malignancy   - Pt to f/u with PCP     # Emphysema on CT chest (Also not documented on the CT Chest report is a cystic or bullous lesion in the Left apex)  - outpt follow up with Dr. Denny, will need PFT's   - asymptomatic    # dvt proph- sc lovenox    Dispo:  stable for d/c home today.     PCP called, left message for Dr Calderon, D/c summary faxed over

## 2018-02-23 NOTE — PROGRESS NOTE ADULT - SUBJECTIVE AND OBJECTIVE BOX
62 year old male w/ PMH of >30 year 1PPD smoking hx, HTN, who comes to the Ed complaining of chest pain and low grade fevers. CT chest done showed a right apical mass. Found to have EkG with t-wave inversions in lead III, had ECHO done and 3 sets of troponins- WNL. Pt to have outpt ischemic w/u. Pt went to IR 2/22/18 to have bx of lung w resultant PTX, pigtail placed. CXR post pigtail placement on 2/22/18 w apical PTX. Pt asymptomatic.  Pt seen 2/23, denies CP/SOB. Wishes to go home.     Vital Signs:  Vital Signs Last 24 Hrs  T(C): 37 (02-23-18 @ 04:58), Max: 37.8 (02-23-18 @ 00:03)  T(F): 98.6 (02-23-18 @ 04:58), Max: 100.1 (02-23-18 @ 00:03)  HR: 60 (02-23-18 @ 05:38) (49 - 62)  BP: 131/67 (02-23-18 @ 04:58) (114/69 - 150/79)  RR: 18 (02-23-18 @ 04:58) (13 - 18)  SpO2: 98% (02-23-18 @ 04:58) (95% - 100%) on (O2)      Relevant labs, radiology and Medications reviewed    CXR 2/23/18 prelim, small apical PTX, pigtail in place      MEDICATIONS  (STANDING):  amLODIPine   Tablet 2.5 milliGRAM(s) Oral daily  docusate sodium 100 milliGRAM(s) Oral two times a day  losartan 100 milliGRAM(s) Oral daily  metoprolol succinate  milliGRAM(s) Oral at bedtime    MEDICATIONS  (PRN):  acetaminophen   Tablet 325 milliGRAM(s) Oral every 4 hours PRN For Temp greater than 38 C (100.4 F) or mild pain  ondansetron Injectable 4 milliGRAM(s) IV Push every 6 hours PRN Nausea  oxyCODONE    IR 5 milliGRAM(s) Oral every 6 hours PRN Moderate Pain (4 - 6)  oxyCODONE    IR 10 milliGRAM(s) Oral every 6 hours PRN Severe Pain (7 - 10)  senna 2 Tablet(s) Oral at bedtime PRN Constipation      Physical exam  Gen NAD  Neuro AAOx3  Card RRR  Pulm clear  Abd soft NT  Ext warm no edema    Tubes: right pigtail to suction, no AL, dressing C/C/I    I&O's Summary    22 Feb 2018 07:01  -  23 Feb 2018 07:00  --------------------------------------------------------  IN: 150 mL / OUT: 0 mL / NET: 150 mL        Assessment  62y Male  s/p Lung biopsy, percutaneous needle 2/22 w resultant PTX s/p  Chest tube placement with CT guidance    PLAN  CXR w small apical PTX, pt asymptomatic, no AL, CT put to waterseal and clamped, will check CXR in a couple of hours, if no increase in PTX, will pull pigtail and check additional CXR.  NO FLYING OR DIVING  until cleared by Dr. Sepulveda/[place]  f/u Dr. Rose 1 week after d/c  outpt w/u for lung mass    Discussed with Dr. Sepulveda and pt

## 2018-02-23 NOTE — DISCHARGE NOTE ADULT - PLAN OF CARE
resolve c/w aspirin  f/u with cardiology for further evaluation evaluate and Tx f/u with Dr Denny for Biopsy results   in case od increased of SOB or CP  return to the hospital cessation

## 2018-02-23 NOTE — PROGRESS NOTE ADULT - ASSESSMENT
- large right upper lobe mass likely primary lung cancer status post ct guided biopsy and course complicated with pneumothorax with pig tail placement   - emphysema   - HTN   - Active smoker       PLAN     - removal of the ct tube as per the thoracic surgery   - will need out PFT and PET scan for staging of the cancer once done   - advised to quit smoking   - discharge planning once ct tube is removed and he will follow up with me regarding biopsy results

## 2018-02-23 NOTE — DISCHARGE NOTE ADULT - MEDICATION SUMMARY - MEDICATIONS TO TAKE
I will START or STAY ON the medications listed below when I get home from the hospital:    acetaminophen 325 mg oral tablet  -- 1 tab(s) by mouth every 8 hours, As Needed for pain   -- Indication: For pAIN    oxyCODONE 5 mg oral tablet  -- 1 tab(s) by mouth every 8 hours, As Needed -Moderate Pain MDD:15mg  -- Indication: For PAIN    aspirin 81 mg oral tablet  -- 1 tab(s) by mouth once a day  -- Indication: For ppxS    losartan 100 mg oral tablet  -- 1 tab(s) by mouth once a day  -- Indication: For htn    Metoprolol Succinate  mg oral tablet, extended release  -- 1 tab(s) by mouth once a day  -- Indication: For HTN    amLODIPine 2.5 mg oral tablet  -- 1 tab(s) by mouth once a day  -- Indication: For HTN

## 2018-06-12 ENCOUNTER — EMERGENCY (EMERGENCY)
Facility: HOSPITAL | Age: 63
LOS: 0 days | Discharge: ANOTHER HOSPITAL | End: 2018-06-12
Attending: EMERGENCY MEDICINE | Admitting: EMERGENCY MEDICINE
Payer: COMMERCIAL

## 2018-06-12 VITALS
OXYGEN SATURATION: 99 % | RESPIRATION RATE: 18 BRPM | SYSTOLIC BLOOD PRESSURE: 167 MMHG | HEART RATE: 73 BPM | DIASTOLIC BLOOD PRESSURE: 91 MMHG | TEMPERATURE: 98 F

## 2018-06-12 VITALS
TEMPERATURE: 98 F | RESPIRATION RATE: 18 BRPM | SYSTOLIC BLOOD PRESSURE: 159 MMHG | HEART RATE: 60 BPM | DIASTOLIC BLOOD PRESSURE: 96 MMHG | OXYGEN SATURATION: 100 %

## 2018-06-12 DIAGNOSIS — C71.9 MALIGNANT NEOPLASM OF BRAIN, UNSPECIFIED: ICD-10-CM

## 2018-06-12 DIAGNOSIS — C34.90 MALIGNANT NEOPLASM OF UNSPECIFIED PART OF UNSPECIFIED BRONCHUS OR LUNG: ICD-10-CM

## 2018-06-12 DIAGNOSIS — R51 HEADACHE: ICD-10-CM

## 2018-06-12 LAB
ALBUMIN SERPL ELPH-MCNC: 3.8 G/DL — SIGNIFICANT CHANGE UP (ref 3.3–5)
ALP SERPL-CCNC: 88 U/L — SIGNIFICANT CHANGE UP (ref 40–120)
ALT FLD-CCNC: 25 U/L — SIGNIFICANT CHANGE UP (ref 12–78)
ANION GAP SERPL CALC-SCNC: 4 MMOL/L — LOW (ref 5–17)
APTT BLD: 29.5 SEC — SIGNIFICANT CHANGE UP (ref 27.5–37.4)
AST SERPL-CCNC: 19 U/L — SIGNIFICANT CHANGE UP (ref 15–37)
BASOPHILS # BLD AUTO: 0.04 K/UL — SIGNIFICANT CHANGE UP (ref 0–0.2)
BASOPHILS NFR BLD AUTO: 0.6 % — SIGNIFICANT CHANGE UP (ref 0–2)
BILIRUB SERPL-MCNC: 0.3 MG/DL — SIGNIFICANT CHANGE UP (ref 0.2–1.2)
BUN SERPL-MCNC: 23 MG/DL — SIGNIFICANT CHANGE UP (ref 7–23)
CALCIUM SERPL-MCNC: 9.2 MG/DL — SIGNIFICANT CHANGE UP (ref 8.5–10.1)
CHLORIDE SERPL-SCNC: 107 MMOL/L — SIGNIFICANT CHANGE UP (ref 96–108)
CO2 SERPL-SCNC: 30 MMOL/L — SIGNIFICANT CHANGE UP (ref 22–31)
CREAT SERPL-MCNC: 0.9 MG/DL — SIGNIFICANT CHANGE UP (ref 0.5–1.3)
EOSINOPHIL # BLD AUTO: 0.1 K/UL — SIGNIFICANT CHANGE UP (ref 0–0.5)
EOSINOPHIL NFR BLD AUTO: 1.6 % — SIGNIFICANT CHANGE UP (ref 0–6)
GLUCOSE SERPL-MCNC: 99 MG/DL — SIGNIFICANT CHANGE UP (ref 70–99)
HCT VFR BLD CALC: 36.2 % — LOW (ref 39–50)
HGB BLD-MCNC: 12.1 G/DL — LOW (ref 13–17)
IMM GRANULOCYTES NFR BLD AUTO: 0.5 % — SIGNIFICANT CHANGE UP (ref 0–1.5)
INR BLD: 1.2 RATIO — HIGH (ref 0.88–1.16)
LYMPHOCYTES # BLD AUTO: 1.83 K/UL — SIGNIFICANT CHANGE UP (ref 1–3.3)
LYMPHOCYTES # BLD AUTO: 29.3 % — SIGNIFICANT CHANGE UP (ref 13–44)
MCHC RBC-ENTMCNC: 28.7 PG — SIGNIFICANT CHANGE UP (ref 27–34)
MCHC RBC-ENTMCNC: 33.4 GM/DL — SIGNIFICANT CHANGE UP (ref 32–36)
MCV RBC AUTO: 85.8 FL — SIGNIFICANT CHANGE UP (ref 80–100)
MONOCYTES # BLD AUTO: 0.56 K/UL — SIGNIFICANT CHANGE UP (ref 0–0.9)
MONOCYTES NFR BLD AUTO: 9 % — SIGNIFICANT CHANGE UP (ref 2–14)
NEUTROPHILS # BLD AUTO: 3.69 K/UL — SIGNIFICANT CHANGE UP (ref 1.8–7.4)
NEUTROPHILS NFR BLD AUTO: 59 % — SIGNIFICANT CHANGE UP (ref 43–77)
NRBC # BLD: 0 /100 WBCS — SIGNIFICANT CHANGE UP (ref 0–0)
PLATELET # BLD AUTO: 393 K/UL — SIGNIFICANT CHANGE UP (ref 150–400)
POTASSIUM SERPL-MCNC: 4.9 MMOL/L — SIGNIFICANT CHANGE UP (ref 3.5–5.3)
POTASSIUM SERPL-SCNC: 4.9 MMOL/L — SIGNIFICANT CHANGE UP (ref 3.5–5.3)
PROT SERPL-MCNC: 7.6 GM/DL — SIGNIFICANT CHANGE UP (ref 6–8.3)
PROTHROM AB SERPL-ACNC: 13 SEC — HIGH (ref 9.8–12.7)
RBC # BLD: 4.22 M/UL — SIGNIFICANT CHANGE UP (ref 4.2–5.8)
RBC # FLD: 16.9 % — HIGH (ref 10.3–14.5)
SODIUM SERPL-SCNC: 141 MMOL/L — SIGNIFICANT CHANGE UP (ref 135–145)
TROPONIN I SERPL-MCNC: <0.015 NG/ML — SIGNIFICANT CHANGE UP (ref 0.01–0.04)
WBC # BLD: 6.25 K/UL — SIGNIFICANT CHANGE UP (ref 3.8–10.5)
WBC # FLD AUTO: 6.25 K/UL — SIGNIFICANT CHANGE UP (ref 3.8–10.5)

## 2018-06-12 PROCEDURE — 70450 CT HEAD/BRAIN W/O DYE: CPT | Mod: 26

## 2018-06-12 PROCEDURE — 72125 CT NECK SPINE W/O DYE: CPT | Mod: 26

## 2018-06-12 PROCEDURE — 93010 ELECTROCARDIOGRAM REPORT: CPT

## 2018-06-12 PROCEDURE — 99285 EMERGENCY DEPT VISIT HI MDM: CPT

## 2018-06-12 RX ORDER — DEXAMETHASONE 0.5 MG/5ML
10 ELIXIR ORAL ONCE
Qty: 0 | Refills: 0 | Status: COMPLETED | OUTPATIENT
Start: 2018-06-12 | End: 2018-06-12

## 2018-06-12 RX ORDER — SODIUM CHLORIDE 9 MG/ML
3 INJECTION INTRAMUSCULAR; INTRAVENOUS; SUBCUTANEOUS ONCE
Qty: 0 | Refills: 0 | Status: COMPLETED | OUTPATIENT
Start: 2018-06-12 | End: 2018-06-12

## 2018-06-12 RX ORDER — DEXAMETHASONE 0.5 MG/5ML
10 ELIXIR ORAL ONCE
Qty: 0 | Refills: 0 | Status: DISCONTINUED | OUTPATIENT
Start: 2018-06-12 | End: 2018-06-12

## 2018-06-12 RX ADMIN — Medication 102 MILLIGRAM(S): at 11:59

## 2018-06-12 RX ADMIN — SODIUM CHLORIDE 3 MILLILITER(S): 9 INJECTION INTRAMUSCULAR; INTRAVENOUS; SUBCUTANEOUS at 11:13

## 2018-06-12 NOTE — ED PROVIDER NOTE - MEDICAL DECISION MAKING DETAILS
62 y/o male c/o severe headaches s/p head injury. Plan for labs, radiologic studies, likely admission.

## 2018-06-12 NOTE — CONSULT NOTE ADULT - SUBJECTIVE AND OBJECTIVE BOX
Patient is a 63y old  Male who presents with a chief complaint of Headache / ataxia    HPI:  64 yo Right handed male PMH HTN, recent dx of lung cancer on chemotherapy presents to the ED c/o severe headache with associated ataxia x 1 week. As per pt, he was walking on his property ~2 weeks ago with his head down and he walked right into the counterbalance weight on a basketball hoop. No LOC but admits he "saw stars". had mild HA after that which got progressively worse last week and he developed difficulty walking / balancing. He came tot he ER for evaluation. CT head sig for 3.4 cm hyperdense mass in the LEFT cerebellum with associated cerebral edema. Prior to my exam pt received 10mg decadron IV which pt states relieved his HA. At the time of my exam pt appears to be comfortable, in NAD. Pt denies HA at present, visual changes, focal numb / ting / weak, word finding difficulty, neck stiffness, photophobia.         PAST MEDICAL & SURGICAL HISTORY:  Lung cancer  HTN  Lung / pericardium bx        FAMILY HISTORY:  Noncontributory         Social Hx: 40 pk yr smoker, worked in "chemical lawn care", no drug or alcohol use        Allergies  No Known Allergies        MEDICATIONS reviewed         ROS: Pertinent positives in HPI, all other ROS were reviewed and are negative.          Vital Signs Last 24 Hrs  T(C): 36.4 (12 Jun 2018 09:51), Max: 36.4 (12 Jun 2018 09:51)  T(F): 97.5 (12 Jun 2018 09:51), Max: 97.5 (12 Jun 2018 09:51)  HR: 60 (12 Jun 2018 09:51) (60 - 60)  BP: 159/96 (12 Jun 2018 09:51) (159/96 - 159/96)  RR: 18 (12 Jun 2018 09:51) (18 - 18)  SpO2: 100% (12 Jun 2018 09:51) (100% - 100%)        Labs:                        12.1   6.25  )-----------( 393      ( 12 Jun 2018 11:08 )             36.2     141  |  107  |  23  ----------------------------<  99  4.9   |  30  |  0.90    Ca    9.2      12 Jun 2018 11:08    TPro  7.6  /  Alb  3.8  /  TBili  0.3  /  DBili  x   /  AST  19  /  ALT  25  /  AlkPhos  88  06-12    PT/INR - ( 12 Jun 2018 11:08 )   PT: 13.0 sec;   INR: 1.20 ratio       PTT - ( 12 Jun 2018 11:08 )  PTT:29.5 sec        Radiology report:  CT Head No Cont (06.12.18 @ 10:43) >  3.4 cm hyperdense mass in the LEFT cerebellum with   associated edema and mass effect on the lateral wall of the fourth   ventricle.   This is worrisome for metastatic lesion.      CT Cervical Spine No Cont (06.12.18 @ 10:43) >  No vertebral fracture is recognized.  multilevel   degenerative disc disease and spondylosis at C2-3 through C7-T1 with loss   of disc height and associated degenerative endplate changes. There is   narrowing of the LEFT C2-3, BILATERAL C3-4, RIGHT C4-5, BILATERAL C5-6   and C6-7 neural foramina due to uncovertebral spurring and facet   osteophytic hypertrophy. There is cord flattening at C3-4 through C6-7   with due to posterior osteophytic ridge/disc complex.  6.4 cm mass in the   RIGHT upper lobe. Emphysema noted in the LEFT upper lobe.          Physical Exam:  Constitutional: Awake / alert  HEENT: PERRLA, EOMI  Neck: Supple, fasciculations b/l SCM  Respiratory: Breath sounds are clear bilaterally  Cardiovascular: S1 and S2, regular rhythm  Gastrointestinal: Soft, NT/ND  Extremities:  no edema  Vascular: No carotid Bruit  Musculoskeletal: no joint swelling/tenderness, no abnormal movements  Skin: No rashes    Neurological Exam:  HF: A x O x 3, appropriately interactive, normal affect, speech fluent, no aphasia or paraphasic errors. Naming /repetition intact   CN: PERRL, EOMI, VFF, facial sensation normal, no NLFD, tongue midline  Motor: No pronator drift, Strength 5/5 in all 4 ext, normal bulk and tone, no tremor, rigidity or bradykinesia  Sens: Intact to light touch  Reflexes: +diffuse hyperreflexia without clonus, no hoffmans, downgoing toes b/l  Coord:  No FNFA, dysmetria, SABINE intact   Gait/Balance: Cannot test

## 2018-06-12 NOTE — ED STATDOCS - PROGRESS NOTE DETAILS
Lito SAMPSON for ED attending, Dr. Delvalle: 64 y/o male with a PMHx of lung cancer presents to the ED c/o head injury 2 weeks ago. Pt was walking when he banged his head on something. Pt had CT scan at time of head injury, which was negative. No pain for first week after injury, but after 1 week pt began experiencing HAs. At time of eval, pt is still reporting pain and dizziness. Will send pt to main ED for further evaluation.

## 2018-06-12 NOTE — ED PROVIDER NOTE - OBJECTIVE STATEMENT
62 y/o male with a PMHx of HTN, lung cancer s/p chemotherapy May 22, 2018 presents to the ED c/o severe headache starting last week. s/p head injury 2 weeks ago. Pt states that he walked into a backboard, hit the right side of his head and "saw stars" Last week, the headaches and associated sx began suddenly. +imbalance, nausea, lightheadedness, blurry vision. Denies vomiting, LOC. Oncologist- Dr. Remi Hunt. PMD Dr. Calderon

## 2018-06-12 NOTE — CONSULT NOTE ADULT - ASSESSMENT
62 yo Right handed male PMH HTN, recent dx of lung cancer on chemotherapy presents to the ED c/o severe headache with associated ataxia x 1 week. CT head sig for 3.4 cm hyperdense mass in the LEFT cerebellum with associated cerebral edema.    -No acute neurosurgical intervention  -MRI +/- brain to evaluate lesion  -Agree with steroids, start Decadron 4mg q 8 hours  -[place] [facility] [place] at [place] if he wanted to go there for further treatment  -May be candidate for SRS very open resxn      Discussed with Dr Delvalle / Dr Pina / Dr Morgan

## 2018-06-12 NOTE — ED PROVIDER NOTE - NS_ ATTENDINGSCRIBEDETAILS _ED_A_ED_FT
I, Colin Morgan MD,  performed the initial face to face bedside interview with this patient regarding history of present illness, review of symptoms and relevant past medical, social and family history.  I completed an independent physical examination.    The history, relevant review of systems, past medical and surgical history, medical decision making, and physical examination was documented by the scribe in my presence and I attest to the accuracy of the documentation.

## 2018-06-12 NOTE — ED STATDOCS - MEDICAL DECISION MAKING DETAILS
Pt unsteady walking. Pt had blood work drawn earlier today, CMP was normal. Will send pt to main ED for further evaluation. Pt unsteady walking. Pt had blood work drawn earlier today, CBC and CMP performed and normal. Will send pt to main ED for further evaluation.

## 2018-06-12 NOTE — ED ADULT TRIAGE NOTE - CHIEF COMPLAINT QUOTE
pt states he hit his head on May 25th on a metal frame, last week noticed dizziness and nausea w/ balance issues, today was about to have chemo for lung CA but was told to come for eval of possible concussion. pt is ambulatory but w/ severe dizziness. alert and oriented x3. no slurring of speech noted. no unilateral weakness noted.

## 2020-03-04 NOTE — ED ADULT NURSE NOTE - OBJECTIVE STATEMENT
"General Surgery Follow Up    Pt returns for follow up visit for cholelithiasis    HPI:  Hola returns today to discuss potential surgical options. Since we last spoke, he states that he has had several more \"attacks\" where he has right sided fullness and discomfort but the nausea has worsened since previous episodes. After his HIDA scan we had discussed surgery and I told him that in my opinion it is maybe slightly greater than a 50-60% chance cholecystectomy would resolve his symptoms. Now, with the worsening nausea in association with his discomfort, there is perhaps more evidence to suggest that cholecystectomy will alleviate his symptoms. He also has a ventral incisional hernia from previous gastric bypass that seems to be getting larger. It is not really painful but now there appears to be intestine in it and he's worried about it becoming strangulated or incarcerated. He has no other new medical concerns. He does take Xarelto.     Review Of Systems    Skin: negative  Ears/Nose/Throat: negative  Respiratory: No shortness of breath, dyspnea on exertion, cough, or hemoptysis  Cardiovascular: negative  Gastrointestinal: as above  Genitourinary: negative  Musculoskeletal: as above  Neurologic: negative  Hematologic/Lymphatic/Immunologic: negative  Endocrine: negative      Past Medical History:   Diagnosis Date     Actinic keratosis      Allergic rhinitis due to animal dander      Allergic rhinitis, cause unspecified      Allergy to mold spores     11/99 skin tests pos. for:  cat/dog/DM/M/G only.      Atrial fibrillation (H)      Bradycardia      CAD (coronary artery disease) 2011    Post AMI and stent placement     Chest pain      Diagnostic skin and sensitization tests (aka ALLERGENS) 11/99 skin tests pos. for:  cat/dog/DM/M/G only.      House dust mite allergy      Hyperlipidemia      HYPOTHYROIDISM NOS 7/5/2006     Morbid obesity (H)      GLADYS on CPAP      Other and unspecified hyperlipidemia      Other premature " beats     PVC     Personal history of diseases of blood and blood-forming organs      Rosacea      Seasonal allergic conjunctivitis      Seasonal allergic rhinitis        Past Surgical History:   Procedure Laterality Date     C ANESTH,PACEMAKER INSERTION  06     C NONSPECIFIC PROCEDURE      left total hip arthroplasty     CORONARY ANGIOGRAPHY ADULT ORDER  2016    medical management     ESOPHAGOSCOPY, GASTROSCOPY, DUODENOSCOPY (EGD), COMBINED N/A 2019    Procedure: Esophagogastroduodenoscopy;  Surgeon: Jadne Finch DO;  Location: [facility]     GASTRIC BYPASS       HEART CATH, ANGIOPLASTY  11    thrombectomy & Integrity 4.0 x 15 mm BMS-RCA     IMPLANT PACEMAKER  3/7/14    Generator change     INJECT EPIDURAL LUMBAR N/A 2019    Procedure: INJECTION, SPINE, LUMBAR 4-5,  EPIDURAL;  Surgeon: Demetris Ybarra MD;  Location:  OR       Social History     Socioeconomic History     Marital status:      Spouse name: Not on file     Number of children: Not on file     Years of education: Not on file     Highest education level: Not on file   Occupational History     Not on file   Social Needs     Financial resource strain: Not on file     Food insecurity:     Worry: Not on file     Inability: Not on file     Transportation needs:     Medical: Not on file     Non-medical: Not on file   Tobacco Use     Smoking status: Former Smoker     Packs/day: 3.00     Years: 25.00     Pack years: 75.00     Types: Cigarettes     Start date:      Last attempt to quit: 1987     Years since quittin.1     Smokeless tobacco: Never Used   Substance and Sexual Activity     Alcohol use: No     Comment: quit 37 years ago     Drug use: No     Sexual activity: Never   Lifestyle     Physical activity:     Days per week: Not on file     Minutes per session: Not on file     Stress: Not on file   Relationships     Social connections:     Talks on phone: Not on file     Gets together: Not on file      Attends [employer] service: Not on file     Active member of club or organization: Not on file     Attends meetings of clubs or organizations: Not on file     Relationship status: Not on file     Intimate partner violence:     Fear of current or ex partner: Not on file     Emotionally abused: Not on file     Physically abused: Not on file     Forced sexual activity: Not on file   Other Topics Concern      Service Not Asked     Blood Transfusions No     Caffeine Concern No     Comment: decaf     Occupational Exposure No     Hobby Hazards No     Sleep Concern Yes     Comment: has cpap but doesn't always feel rested     Stress Concern No     Weight Concern Yes     Special Diet No     Back Care No     Exercise Yes     Comment: walking daily 20-25 min      Bike Helmet Not Asked     Seat Belt Yes     Self-Exams Not Asked     Parent/sibling w/ CABG, MI or angioplasty before 65F 55M? No   Social History Narrative     Not on file       Current Outpatient Medications   Medication Sig Dispense Refill     acetaminophen (TYLENOL) 500 MG tablet Take 1,000 mg by mouth 2 times daily        ASPIRIN NOT PRESCRIBED (INTENTIONAL) Please choose reason not prescribed, below       atorvastatin (LIPITOR) 40 MG tablet TAKE 1 TABLET EVERY DAY 90 tablet 3     calcium citrate-vitamin D (CITRACAL MAXIMUM) 315-250 MG-UNIT TABS per tablet Take 1 tablet by mouth At Bedtime        carboxymethylcellulose (REFRESH PLUS) 0.5 % SOLN 1 drop 3 times daily as needed for dry eyes       Cholecalciferol (VITAMIN D) 2000 UNITS CAPS Take 2,000 Units by mouth At Bedtime        Coenzyme Q10 (COQ10) 400 MG CAPS Take 800 mg by mouth At Bedtime        doxycycline hyclate (PERIOSTAT) 20 MG tablet Take 2 tablets (40 mg) by mouth daily with flares of rosacea for about 10 consecutive days 60 tablet 3     erythromycin (ROMYCIN) 5 MG/GM ophthalmic ointment Place 0.5 inches into both eyes daily       fexofenadine (ALLEGRA) 180 MG tablet Take 180 mg by mouth daily    "    fluticasone (FLONASE) 50 MCG/ACT nasal spray Spray 2 sprays into both nostrils daily as needed for rhinitis or allergies 16 g 5     gabapentin (NEURONTIN) 600 MG tablet 1 tablet in the morning, 1 in the afternoon and 2 at night 360 tablet 3     isosorbide mononitrate (IMDUR) 30 MG 24 hr tablet Take 0.5 tablets (15 mg) by mouth daily 45 tablet 3     levothyroxine (SYNTHROID/LEVOTHROID) 175 MCG tablet TAKE 1 TABLET EVERY DAY 30 tablet 0     metoprolol succinate ER (TOPROL-XL) 100 MG 24 hr tablet TAKE 1 TABLET EVERY DAY 90 tablet 3     Multiple Vitamins-Minerals (PRESERVISION AREDS 2 PO) Take by mouth 2 times daily       Neomycin-Bacitracin-Polymyxin (NEOSPORIN EX) Apply daily as needed       nitroGLYcerin (NITROSTAT) 0.4 MG sublingual tablet For chest pain place 1 tablet under the tongue every 5 minutes for 3 doses. If symptoms persist 5 minutes after 1st dose call 911. 25 tablet 2     omeprazole (PRILOSEC) 40 MG DR capsule TAKE 1 CAPSULE EVERY DAY  30  TO  60  MINUTES BEFORE A MEAL 90 capsule 1     order for DME Equipment being ordered: chin strap for CPAP mask 1 each 0     order for DME Equipment being ordered: Auto CPAP unit with humidifier -- current settings are 14-20 cm H2O 1 Units 0     order for DME Knee-high venous compression stockings.  Mild pressure for compression, 20-30. 4 each 3     Pediatric Multivit-Minerals-C (FLINTSTONES COMPLETE PO) Take 1 tablet by mouth daily        Polyethylene Glycol 3350 (MIRALAX PO) Take 17 g by mouth daily as needed        rivaroxaban ANTICOAGULANT (XARELTO) 20 MG TABS tablet Take 1 tablet (20 mg) by mouth every morning 90 tablet 3     tacrolimus (PROTOPIC) 0.1 % external ointment Apply twice daily as needed for rash on face 60 g 2     UNABLE TO FIND MEDICATION NAME: hemp cream for pain         Medications and history reviewed    Physical exam:  Vitals: /82   Temp 97.7  F (36.5  C) (Temporal)   Ht 1.88 m (6' 2\")   Wt 140.6 kg (310 lb)   BMI 39.80 kg/m    BMI= " Body mass index is 39.8 kg/m .    Constitutional: Healthy, alert, non-distressed   Head: Normo-cephalic, atraumatic, no lesions, masses or tenderness   Cardiovascular: RRR, no new murmurs, +S1, +S2 heart sounds, no clicks, rubs or gallops   Respiratory: CTAB, no rales, rhonchi or wheezing, equal chest rise, good respiratory effort   Gastrointestinal: Soft, non-tender, non distended, no rebound rigidity or guarding, upper midline with obvious hernia ~3cm defect, soft, reducible, likely small or large intestine in the hernia.  : Deferred  Musculoskeletal: Moves all extremities, normal  strength, no deformities noted   Skin: No suspicious lesions or rashes   Psychiatric: Mentation appears normal, affect appropriate   Hematologic/Lymphatic/Immunologic: Normal cervical and supraclavicular lymph nodes   Patient able to get up on table without difficulty.      Assessment:     ICD-10-CM    1. Symptomatic cholelithiasis K80.20    2. Ventral incisional hernia K43.2      Plan: At this time Hola is ready to move forward with cholecystectomy, I advised him that I suspect he has about a 60-70% chance his symptoms will resolve with cholecystectomy. Discussed imaging findings and what to expect with surgery. Risks of bleeding, infection, anesthesia complications, conversion to open, injury to nearby structures and bile duct injury all discussed.  We went over my discharge instructions and post-op restrictions.    We further discussed hernia repair surgery with mesh for his enlarging ventral incisional hernia. I told him due to the fact that this appears to be incarcerated with intestine, and that a bile duct will be transected, it would be best to perform hernia repair surgery with mesh at a later date due to the infection risk. He is ok with this and in fact given the limitation and post-op hernia repair restrictions, waiting until later this summer works better for him anyway.      Jaden Finch, DO     Pt presents to the with complaints of headache and nausea.  2 weeks ago he hit his head on a pole.

## 2020-07-09 NOTE — ED ADULT NURSE NOTE - NS ED NURSE REPORT GIVEN DT
20-Feb-2018 16:08 Stelara Counseling:  I discussed with the patient the risks of ustekinumab including but not limited to immunosuppression, malignancy, posterior leukoencephalopathy syndrome, and serious infections.  The patient understands that monitoring is required including a PPD at baseline and must alert us or the primary physician if symptoms of infection or other concerning signs are noted.

## 2021-08-27 ENCOUNTER — TRANSCRIPTION ENCOUNTER (OUTPATIENT)
Age: 66
End: 2021-08-27

## 2021-08-30 ENCOUNTER — TRANSCRIPTION ENCOUNTER (OUTPATIENT)
Age: 66
End: 2021-08-30

## 2021-09-03 ENCOUNTER — TRANSCRIPTION ENCOUNTER (OUTPATIENT)
Age: 66
End: 2021-09-03

## 2021-09-07 ENCOUNTER — TRANSCRIPTION ENCOUNTER (OUTPATIENT)
Age: 66
End: 2021-09-07

## 2021-09-10 ENCOUNTER — TRANSCRIPTION ENCOUNTER (OUTPATIENT)
Age: 66
End: 2021-09-10

## 2021-09-12 ENCOUNTER — TRANSCRIPTION ENCOUNTER (OUTPATIENT)
Age: 66
End: 2021-09-12

## 2021-09-15 ENCOUNTER — TRANSCRIPTION ENCOUNTER (OUTPATIENT)
Age: 66
End: 2021-09-15

## 2021-12-05 ENCOUNTER — TRANSCRIPTION ENCOUNTER (OUTPATIENT)
Age: 66
End: 2021-12-05

## 2023-04-21 NOTE — ED PROVIDER NOTE - NORMAL, MLM
Reason for office visit:  Follow-up PAF     Impression:   • Paroxysmal, drug refractory (Norpace, dofetilide) atrial fibrillation s/p AF cryo ablation 5/6/21  ? Symptomatic palpitations, generalized weakness, and SOB  ? Diagnosed 2009  ? S/P AF RF ablation with PVI 10/15/14 followed by Tikosyn loading ([facility])  ? Failed Norpace (2010 - 2014) , Tikosyn discontinued due to maintenance of NSR in 4/2015  ? Previously on pill in pocket with flecainide (did not consistently take with atenolol as it made him feel poorly and syncopal episodes.  ? Initially seen by EP on 1/7/19  ? 12/15/2020: ECG confirms recurrent AF  ? S/p DCCV followed by dofetilide loading on 12/16/20  ? Maintaining sinus rhythm with improvement to his symptoms  ? Back in AF 3/14/21, patient reported to ED, S/P DCCV   ? S/p redo PVI ablation 5/5/21  ? Maintaining SR  • High risk medication on Tikosyn 500 mcg BID  ? Initiated 12/16/2020  ? Stopped 5 days prior to procedure 5/5/21, re-initiated postprocedure with plan to be utilized for approximately 3 months  ? ECG 4/21/23: SB with PACs, QTc 410 ms  ? 1/27/23: Mag 2.2   ? 1/17/23: K 4.6, creatinine 0.99  • CHADSVASc 2 (HTN, age)  • Anticoagulated on Eliquis 5 mg BID  ? Initiated on September 2018  ? Dose appropriate given age, weight and creatinine  ? Previously on Xarelto  • Sinus bradycardia  • RBBB  • Cardiac Monitoring/testing  ? Stress test 1/22/19: no evidence of ischemia, good functional capacity, no arrhyhtmias   ? Echo 1/22/19: EF 65%, no significant valvular abnormalities, TONI 48 ml/m2, IVS 1.2 cm  ? Echo 10/15/14: EF 55-60%  • Other testing  ? CT heart structure 4/15/21: mild AP dimension narrowing of the left inferior pulmonary vein.  No evidence of left atrial thrombus, mild coronary artery calcification and mild irregularity/thickening of the aortic valve leaflets.     Recommendations:   • Continue dofetilide for rhythm control strategy and Eliquis for anticoagulation.  No  medication changes from EP standpoint.    • Obtain BMP and magnesium level in July.  • Follow-up in 6 months with APC.  • Due to symptoms that occur exclusively with exercise, will order graded exercise stress test to assess for ischemia.  • Discussed symptoms that would prompt emergency department visit.  • Monitor for other subset of symptoms that occur with walking, as far as frequency, so cardiac monitor could be considered.  Right now symptoms are only occurring every 6 months.     HPI:  This is a 70-year-old seen in the today paroxysmal drug refractory atrial fibrillation status AF ablation in 2014 and redo PVI ablation in 2021.  Currently managed on dofetilide 500 mcg twice daily rhythm control strategy and Eliquis for anticoagulation.  Denies current bleeding issues or concerns.  One brief episode of epistaxis and occasional broken blood vessels in left eye, that occurred even before starting Eliquis.  ECG today sinus bradycardia with PACs.    Overall doing well, continues to keep active.  Has rare episodes of palpitations or skipped beats every few weeks.  No shortness of breath at rest and denies chest pain or lower extremity swelling.  Walks 60-90 minutes a day and bikes 2000 miles in the summer.  Approximately every 6 months while walking has a “strange feeling” with shortness of breath, lightheadedness, diaphoresis, and fogginess that lasts approximately 5 minutes.  Last episode was 1-2 months ago.  Is unable to identify any triggers.  The other set of symptoms discussed today occurs exclusively with exercise, for example in mowing lawn or snow blowing.  Reports warmth in the chest with achiness in the left back the last approximately 5 minutes.  These symptoms have occurred since he had last stress test.  Last episode occurred approximately 1 week ago while mowing the lawn.  Only has lightheadedness if he gets up from sitting or if he is bending over.  Unaccompanied at the visit today.     PAST MEDICAL  HX:    Basal cell carcinoma                                          Paroxysmal atrial fibrillation                  12/05/2018      Comment: 2014 catheter ablation.  Usually goes into 2                times per year.  Takes a flecainide and                atenolol.      Essential hypertension                          12/05/2018    Posterior vitreous detachment of left eye                      Allergies and Medications were reviewed     PHYSICAL EXAM:  Visit Vitals  /62 (BP Location: LUE - Left upper extremity, Patient Position: Sitting, Cuff Size: Regular)   Pulse (!) 53   Ht 6' 5\" (1.956 m)   Wt 102 kg (224 lb 12.8 oz)   SpO2 97%   BMI 26.66 kg/m²     GENERAL:  Cooperative, sitting comfortably, in no acute distress.  HEAD: Normocephalic, Non-traumatic  CARDIOVASCULAR:   Regular rate and rhythm, normal S1/S2, no murmur.  RESPIRATORY:  Clear to ausculation bilaterally. No wheezes, rales or rhonchi.  EXTREMITIES: No edema bilateral lower extremities.  NEURO: No obvious motor or sensor deficits  PSYCHIATRIC:   Alert and oriented to person, place, and time.  INTEGUMENTARY:  Warm and dry.     I have personally reviewed and interpreted EKG below.  I have reviewed and summarized old records as per the impression section.     EKG:  SB with PACs, QTc 410 ms      fawn all pertinent systems normal

## 2023-11-29 ENCOUNTER — OFFICE (OUTPATIENT)
Dept: URBAN - METROPOLITAN AREA CLINIC 12 | Facility: CLINIC | Age: 68
Setting detail: OPHTHALMOLOGY
End: 2023-11-29
Payer: MEDICARE

## 2023-11-29 DIAGNOSIS — B02.1: ICD-10-CM

## 2023-11-29 DIAGNOSIS — H25.13: ICD-10-CM

## 2023-11-29 PROCEDURE — 92004 COMPRE OPH EXAM NEW PT 1/>: CPT | Performed by: STUDENT IN AN ORGANIZED HEALTH CARE EDUCATION/TRAINING PROGRAM

## 2023-11-29 PROCEDURE — 92285 EXTERNAL OCULAR PHOTOGRAPHY: CPT | Performed by: STUDENT IN AN ORGANIZED HEALTH CARE EDUCATION/TRAINING PROGRAM

## 2023-11-29 ASSESSMENT — CONFRONTATIONAL VISUAL FIELD TEST (CVF)
OD_FINDINGS: FULL
OS_FINDINGS: FULL

## 2023-11-30 PROBLEM — B02.1 HERPES ZOSTER SHINGLES/ZONA ; LEFT EYE: Status: ACTIVE | Noted: 2023-11-29

## 2023-11-30 ASSESSMENT — SPHEQUIV_DERIVED
OD_SPHEQUIV: 1.375
OS_SPHEQUIV: 1.375

## 2023-11-30 ASSESSMENT — REFRACTION_AUTOREFRACTION
OD_CYLINDER: -0.75
OD_AXIS: 109
OS_SPHERE: +2.00
OS_CYLINDER: -1.25
OS_AXIS: 096
OD_SPHERE: +1.75

## 2023-12-04 ENCOUNTER — OFFICE (OUTPATIENT)
Dept: URBAN - METROPOLITAN AREA CLINIC 12 | Facility: CLINIC | Age: 68
Setting detail: OPHTHALMOLOGY
End: 2023-12-04
Payer: MEDICARE

## 2023-12-04 DIAGNOSIS — B02.1: ICD-10-CM

## 2023-12-04 DIAGNOSIS — H25.13: ICD-10-CM

## 2023-12-04 PROCEDURE — 99213 OFFICE O/P EST LOW 20 MIN: CPT | Performed by: STUDENT IN AN ORGANIZED HEALTH CARE EDUCATION/TRAINING PROGRAM

## 2023-12-04 ASSESSMENT — SPHEQUIV_DERIVED
OS_SPHEQUIV: 1.75
OD_SPHEQUIV: 1.125

## 2023-12-04 ASSESSMENT — REFRACTION_AUTOREFRACTION
OS_AXIS: 075
OD_AXIS: 109
OD_SPHERE: +1.50
OS_SPHERE: +2.75
OD_CYLINDER: -0.75
OS_CYLINDER: -2.00

## 2023-12-04 ASSESSMENT — CONFRONTATIONAL VISUAL FIELD TEST (CVF)
OS_FINDINGS: FULL
OD_FINDINGS: FULL

## 2024-04-01 ENCOUNTER — OFFICE (OUTPATIENT)
Dept: URBAN - METROPOLITAN AREA CLINIC 12 | Facility: CLINIC | Age: 69
Setting detail: OPHTHALMOLOGY
End: 2024-04-01
Payer: MEDICARE

## 2024-04-01 DIAGNOSIS — B02.1: ICD-10-CM

## 2024-04-01 DIAGNOSIS — H90.3: ICD-10-CM

## 2024-04-01 DIAGNOSIS — H25.13: ICD-10-CM

## 2024-04-01 PROCEDURE — 92567 TYMPANOMETRY: CPT | Mod: AB

## 2024-04-01 PROCEDURE — 99213 OFFICE O/P EST LOW 20 MIN: CPT | Performed by: STUDENT IN AN ORGANIZED HEALTH CARE EDUCATION/TRAINING PROGRAM

## 2024-04-01 PROCEDURE — 92557 COMPREHENSIVE HEARING TEST: CPT | Mod: AB

## 2024-05-24 PROBLEM — C34.90 MALIGNANT NEOPLASM OF UNSPECIFIED PART OF UNSPECIFIED BRONCHUS OR LUNG: Chronic | Status: ACTIVE | Noted: 2018-06-12

## 2024-06-07 ENCOUNTER — APPOINTMENT (OUTPATIENT)
Dept: UROLOGY | Facility: CLINIC | Age: 69
End: 2024-06-07
Payer: MEDICARE

## 2024-06-07 VITALS
OXYGEN SATURATION: 97 % | SYSTOLIC BLOOD PRESSURE: 133 MMHG | HEART RATE: 66 BPM | RESPIRATION RATE: 16 BRPM | BODY MASS INDEX: 29.55 KG/M2 | HEIGHT: 68 IN | WEIGHT: 195 LBS | DIASTOLIC BLOOD PRESSURE: 76 MMHG

## 2024-06-07 DIAGNOSIS — N40.1 BENIGN PROSTATIC HYPERPLASIA WITH LOWER URINARY TRACT SYMPMS: ICD-10-CM

## 2024-06-07 DIAGNOSIS — R97.20 ELEVATED PROSTATE, SPECIFIC ANTIGEN [PSA]: ICD-10-CM

## 2024-06-07 DIAGNOSIS — Z86.79 PERSONAL HISTORY OF OTHER DISEASES OF THE CIRCULATORY SYSTEM: ICD-10-CM

## 2024-06-07 DIAGNOSIS — Z85.118 PERSONAL HISTORY OF OTHER MALIGNANT NEOPLASM OF BRONCHUS AND LUNG: ICD-10-CM

## 2024-06-07 DIAGNOSIS — Z00.00 ENCOUNTER FOR GENERAL ADULT MEDICAL EXAMINATION W/OUT ABNORMAL FINDINGS: ICD-10-CM

## 2024-06-07 DIAGNOSIS — N52.9 MALE ERECTILE DYSFUNCTION, UNSPECIFIED: ICD-10-CM

## 2024-06-07 DIAGNOSIS — N20.0 CALCULUS OF KIDNEY: ICD-10-CM

## 2024-06-07 DIAGNOSIS — Z85.841 PERSONAL HISTORY OF MALIGNANT NEOPLASM OF BRAIN: ICD-10-CM

## 2024-06-07 DIAGNOSIS — Z87.891 PERSONAL HISTORY OF NICOTINE DEPENDENCE: ICD-10-CM

## 2024-06-07 PROCEDURE — 99204 OFFICE O/P NEW MOD 45 MIN: CPT

## 2024-06-07 RX ORDER — TAMSULOSIN HYDROCHLORIDE 0.4 MG/1
0.4 CAPSULE ORAL
Qty: 90 | Refills: 3 | Status: ACTIVE | COMMUNITY
Start: 2024-06-07 | End: 1900-01-01

## 2024-06-07 RX ORDER — TADALAFIL 20 MG/1
20 TABLET ORAL
Qty: 15 | Refills: 0 | Status: ACTIVE | COMMUNITY
Start: 2024-06-07 | End: 1900-01-01

## 2024-06-07 NOTE — REVIEW OF SYSTEMS
[Negative] : Heme/Lymph [see HPI] : see HPI [Incontinence] : incontinence [Hesitancy] : urinary hesitancy [Nocturia] : nocturia [Dysuria] : no dysuria

## 2024-06-07 NOTE — LETTER BODY
[Dear  ___] : Dear  [unfilled], [Consult Letter:] : I had the pleasure of evaluating your patient, [unfilled]. [( Thank you for referring [unfilled] for consultation for _____ )] : Thank you for referring [unfilled] for consultation for [unfilled] [Please see my note below.] : Please see my note below. [Consult Closing:] : Thank you very much for allowing me to participate in the care of this patient.  If you have any questions, please do not hesitate to contact me. [Sincerely,] : Sincerely, [FreeTextEntry3] : Bryan Luna MD  of Urology [facility]  [facility] Offices: [address] [facility], [address] [place] [address], [facility] [address] [facility], [place] [address]  TEL: [phone] FAX: [phone]

## 2024-06-07 NOTE — HISTORY OF PRESENT ILLNESS
[FreeTextEntry1] : Primary care physician Dr. Africa Calderon.  69-year-old man seen 06/07/2024 with complaint of Elevated PSA. Recent PSA (05/14/24) 5.910. No family history of prostate cancer.  Denies any recent unintentional weight loss, night sweats and new bone or back pain.  Smoked a pack a day for 7 years. Quit 7 years ago.  Endorses weak stream, straining, hesitancy, urgency and some urge incontinence.  Urinary symptoms have worsened in the past 6 months.  Daytime frequency every 2 hours and nocturia 2 x.  Denies dysuria, hematuria, lower abdominal or flank pain, nausea, vomiting, fever, chills or rigors.  Difficulty with erections. E 2-3/5. Has not tried Tadalafil or Sildenafil.  Recent Testosterone 451 (05/14/24).  Is being treated by [facility] for non-small cell lung ca. Next CT in August. CT (04/18/24) showed bilateral renal cysts. Mild left renal cortical atrophy and right lower pole calculus.  Reports he may have passed a stone in the past. Has not done 24-hour urine.

## 2024-06-07 NOTE — PHYSICAL EXAM
[Normal Appearance] : normal appearance [Well Groomed] : well groomed [General Appearance - In No Acute Distress] : no acute distress [] : no respiratory distress [Respiration, Rhythm And Depth] : normal respiratory rhythm and effort [Exaggerated Use Of Accessory Muscles For Inspiration] : no accessory muscle use [Abdomen Soft] : soft [Abdomen Tenderness] : non-tender [Urinary Bladder Findings] : the bladder was normal on palpation [Prostate Tenderness] : the prostate was not tender [No Prostate Nodules] : no prostate nodules [Normal Station and Gait] : the gait and station were normal for the patient's age [No Focal Deficits] : no focal deficits [Oriented To Time, Place, And Person] : oriented to person, place, and time [Affect] : the affect was normal [Mood] : the mood was normal [Prostate Size ___ (0-4)] : prostate size [unfilled] (scale: 0-4) [de-identified] : Hernia

## 2024-06-07 NOTE — ASSESSMENT
[FreeTextEntry1] : Labs and Imaging Reviewed.  5/14/2024: PSA: 5.91 Creatinine: 0.98 Urinalysis: Negative  Elevated PSA: Discussed with the patient that PSA is a substance produced by the prostate gland. Elevated PSA levels may indicate a noncancerous condition such as prostatitis, or an enlarged prostate but it may also indicate prostate cancer. Discussed PSA screening and latest recommendations/guidelines- USPTF and AUA.  Explained that only way to rule out Prostate cancer in a patient with elevated PSA, increased PSA velocity or abnormal digital rectal exam is to do Prostate needle biopsy. We will repeat Total and Free PSA 1 week before next appointment.  If concerning will get MRI prostate.   Right Kidney Stone: No mention of kidney stone size on the report. Recommended Kidney stone prevention diet: Good oral hydration so that urine is clear to light yellow, usually 1.5 to 2 Liters of fluids, mainly water Increasing Citrate in diet by consuming citrus fruits and juices- babar, limes, oranges, grapefruits and berries Less red meat Less salt Limit foods with oxalate like- dark green vegetables, rhubarb, chocolate, wheat bran, nuts, cranberries, and beans  Erectile Dysfunction:  Discussed treatment options. Wants to try Cialis/Tadalafil. Discussed proper use. The patient understands that these medications are not initiators of erection and that he will still require sexual stimulation. He was advised to take the medication 30 to 60 minutes prior to sexual activity and that the efficacy of the medication is decreased following a high-fat meal or concurrent use of alcohol. Explained the common adverse effects of therapy including, but not limited to headache, flushing, upset stomach, nasal congestion, abnormal vision, back pain, and myalgia. Asked patient to stop taking the medicine if he develops chest pain, visual or auditory disturbance. Explained priapism: if erection does not go down after ejaculation or lasts more than 4 hours to present to emergency room. Prescribed tadalafil 20 mg. Recommended to try half tablet first.  Benign Prostatic Hyperplasia: PVR: 58ml. Discussed treatment options. Patient was instructed to take 0.4 mg of Flomax at bedtime. The adverse effects, including low blood pressure and orthostatic changes, retrograde ejaculation, runny nose, GI upset, were discussed with the patient. Pt will seek emergent medical attention if he develops allergic reaction to medication. Pt will discontinue tamsulosin if he develops allergic reaction or diagnosed with sulfa allergy. Asked patient not take Flomax and Tadalafil with in 4 hours of each other as both can drop blood pressure.   Return to office in 4 weeks or sooner if any issues: will do uroflow and check post void residual.

## 2024-06-10 LAB
APPEARANCE: CLEAR
BACTERIA UR CULT: NORMAL
BACTERIA: NEGATIVE /HPF
BILIRUBIN URINE: NEGATIVE
BLOOD URINE: NEGATIVE
CAST: 1 /LPF
COLOR: YELLOW
EPITHELIAL CELLS: 1 /HPF
GLUCOSE QUALITATIVE U: NEGATIVE MG/DL
KETONES URINE: ABNORMAL MG/DL
LEUKOCYTE ESTERASE URINE: ABNORMAL
MICROSCOPIC-UA: NORMAL
NITRITE URINE: NEGATIVE
PH URINE: 6
PROTEIN URINE: NORMAL MG/DL
RED BLOOD CELLS URINE: 0 /HPF
REVIEW: NORMAL
SPECIFIC GRAVITY URINE: 1.02
UROBILINOGEN URINE: 0.2 MG/DL
WHITE BLOOD CELLS URINE: 3 /HPF

## 2024-07-18 ENCOUNTER — LABORATORY RESULT (OUTPATIENT)
Age: 69
End: 2024-07-18

## 2024-07-19 ENCOUNTER — APPOINTMENT (OUTPATIENT)
Dept: UROLOGY | Facility: CLINIC | Age: 69
End: 2024-07-19
Payer: MEDICARE

## 2024-07-19 DIAGNOSIS — N52.9 MALE ERECTILE DYSFUNCTION, UNSPECIFIED: ICD-10-CM

## 2024-07-19 DIAGNOSIS — N40.1 BENIGN PROSTATIC HYPERPLASIA WITH LOWER URINARY TRACT SYMPMS: ICD-10-CM

## 2024-07-19 DIAGNOSIS — R97.20 ELEVATED PROSTATE, SPECIFIC ANTIGEN [PSA]: ICD-10-CM

## 2024-07-19 PROCEDURE — 99214 OFFICE O/P EST MOD 30 MIN: CPT

## 2024-07-29 DIAGNOSIS — R97.20 ELEVATED PROSTATE, SPECIFIC ANTIGEN [PSA]: ICD-10-CM

## 2024-08-20 ENCOUNTER — APPOINTMENT (OUTPATIENT)
Dept: MRI IMAGING | Facility: CLINIC | Age: 69
End: 2024-08-20

## 2024-08-20 ENCOUNTER — OUTPATIENT (OUTPATIENT)
Dept: OUTPATIENT SERVICES | Facility: HOSPITAL | Age: 69
LOS: 1 days | End: 2024-08-20
Payer: MEDICARE

## 2024-08-20 DIAGNOSIS — R97.20 ELEVATED PROSTATE SPECIFIC ANTIGEN [PSA]: ICD-10-CM

## 2024-08-20 PROCEDURE — 72197 MRI PELVIS W/O & W/DYE: CPT

## 2024-08-20 PROCEDURE — 76498 UNLISTED MR PROCEDURE: CPT

## 2024-08-20 PROCEDURE — A9585: CPT

## 2024-08-20 PROCEDURE — 76498P: CUSTOM | Mod: 26,MH

## 2024-08-20 PROCEDURE — 72197 MRI PELVIS W/O & W/DYE: CPT | Mod: 26,MH

## 2024-11-05 ENCOUNTER — TRANSCRIPTION ENCOUNTER (OUTPATIENT)
Age: 69
End: 2024-11-05

## 2024-11-05 ENCOUNTER — OUTPATIENT (OUTPATIENT)
Dept: OUTPATIENT SERVICES | Facility: HOSPITAL | Age: 69
LOS: 1 days | Discharge: HOME | End: 2024-11-05
Payer: MEDICARE

## 2024-11-05 VITALS
OXYGEN SATURATION: 100 % | DIASTOLIC BLOOD PRESSURE: 86 MMHG | TEMPERATURE: 98 F | HEIGHT: 69 IN | SYSTOLIC BLOOD PRESSURE: 170 MMHG | WEIGHT: 179.9 LBS | HEART RATE: 58 BPM | RESPIRATION RATE: 16 BRPM

## 2024-11-05 VITALS
DIASTOLIC BLOOD PRESSURE: 88 MMHG | SYSTOLIC BLOOD PRESSURE: 158 MMHG | OXYGEN SATURATION: 100 % | RESPIRATION RATE: 15 BRPM | HEART RATE: 60 BPM

## 2024-11-05 DIAGNOSIS — R94.39 ABNORMAL RESULT OF OTHER CARDIOVASCULAR FUNCTION STUDY: ICD-10-CM

## 2024-11-05 LAB
ANION GAP SERPL CALC-SCNC: 2 MMOL/L — LOW (ref 5–17)
BUN SERPL-MCNC: 18 MG/DL — SIGNIFICANT CHANGE UP (ref 7–23)
CALCIUM SERPL-MCNC: 9.1 MG/DL — SIGNIFICANT CHANGE UP (ref 8.5–10.1)
CHLORIDE SERPL-SCNC: 109 MMOL/L — HIGH (ref 96–108)
CO2 SERPL-SCNC: 29 MMOL/L — SIGNIFICANT CHANGE UP (ref 22–31)
CREAT SERPL-MCNC: 0.97 MG/DL — SIGNIFICANT CHANGE UP (ref 0.5–1.3)
EGFR: 85 ML/MIN/1.73M2 — SIGNIFICANT CHANGE UP
GLUCOSE SERPL-MCNC: 91 MG/DL — SIGNIFICANT CHANGE UP (ref 70–99)
HCT VFR BLD CALC: 40.7 % — SIGNIFICANT CHANGE UP (ref 39–50)
HGB BLD-MCNC: 13.6 G/DL — SIGNIFICANT CHANGE UP (ref 13–17)
MCHC RBC-ENTMCNC: 30.5 PG — SIGNIFICANT CHANGE UP (ref 27–34)
MCHC RBC-ENTMCNC: 33.4 G/DL — SIGNIFICANT CHANGE UP (ref 32–36)
MCV RBC AUTO: 91.3 FL — SIGNIFICANT CHANGE UP (ref 80–100)
PLATELET # BLD AUTO: 244 K/UL — SIGNIFICANT CHANGE UP (ref 150–400)
POTASSIUM SERPL-MCNC: 3.9 MMOL/L — SIGNIFICANT CHANGE UP (ref 3.5–5.3)
POTASSIUM SERPL-SCNC: 3.9 MMOL/L — SIGNIFICANT CHANGE UP (ref 3.5–5.3)
RBC # BLD: 4.46 M/UL — SIGNIFICANT CHANGE UP (ref 4.2–5.8)
RBC # FLD: 13.7 % — SIGNIFICANT CHANGE UP (ref 10.3–14.5)
SODIUM SERPL-SCNC: 140 MMOL/L — SIGNIFICANT CHANGE UP (ref 135–145)
WBC # BLD: 6.91 K/UL — SIGNIFICANT CHANGE UP (ref 3.8–10.5)
WBC # FLD AUTO: 6.91 K/UL — SIGNIFICANT CHANGE UP (ref 3.8–10.5)

## 2024-11-05 PROCEDURE — 0523T NTRAPX C FFR W/3D FUNCJL MAP: CPT

## 2024-11-05 PROCEDURE — 80048 BASIC METABOLIC PNL TOTAL CA: CPT

## 2024-11-05 PROCEDURE — C1760: CPT

## 2024-11-05 PROCEDURE — 93458 L HRT ARTERY/VENTRICLE ANGIO: CPT

## 2024-11-05 PROCEDURE — 85027 COMPLETE CBC AUTOMATED: CPT

## 2024-11-05 PROCEDURE — C1769: CPT

## 2024-11-05 PROCEDURE — 36415 COLL VENOUS BLD VENIPUNCTURE: CPT

## 2024-11-05 PROCEDURE — C1887: CPT

## 2024-11-05 PROCEDURE — C1894: CPT

## 2024-11-05 RX ORDER — SODIUM CHLORIDE 9 MG/ML
1000 INJECTION, SOLUTION INTRAMUSCULAR; INTRAVENOUS; SUBCUTANEOUS
Refills: 0 | Status: DISCONTINUED | OUTPATIENT
Start: 2024-11-05 | End: 2024-11-05

## 2024-11-05 RX ORDER — TAMSULOSIN HCL 0.4 MG
1 CAPSULE ORAL
Refills: 0 | DISCHARGE

## 2024-11-05 RX ORDER — TAMSULOSIN HCL 0.4 MG
0 CAPSULE ORAL
Refills: 0 | DISCHARGE

## 2024-11-05 RX ORDER — ROSUVASTATIN CALCIUM 10 MG
1 TABLET ORAL
Qty: 90 | Refills: 2
Start: 2024-11-05 | End: 2025-08-01

## 2024-11-05 RX ORDER — SODIUM CHLORIDE 9 MG/ML
250 INJECTION, SOLUTION INTRAMUSCULAR; INTRAVENOUS; SUBCUTANEOUS ONCE
Refills: 0 | Status: COMPLETED | OUTPATIENT
Start: 2024-11-05 | End: 2024-11-05

## 2024-11-05 RX ORDER — GABAPENTIN 300 MG/1
0 CAPSULE ORAL
Refills: 0 | DISCHARGE

## 2024-11-05 RX ORDER — GABAPENTIN 300 MG/1
1 CAPSULE ORAL
Refills: 0 | DISCHARGE

## 2024-11-05 RX ADMIN — SODIUM CHLORIDE 168 MILLILITER(S): 9 INJECTION, SOLUTION INTRAMUSCULAR; INTRAVENOUS; SUBCUTANEOUS at 17:04

## 2024-11-05 RX ADMIN — SODIUM CHLORIDE 250 MILLILITER(S): 9 INJECTION, SOLUTION INTRAMUSCULAR; INTRAVENOUS; SUBCUTANEOUS at 11:45

## 2024-11-05 NOTE — ASU PATIENT PROFILE, ADULT - FALL HARM RISK - UNIVERSAL INTERVENTIONS
Bed in lowest position, wheels locked, appropriate side rails in place/Call bell, personal items and telephone in reach/Instruct patient to call for assistance before getting out of bed or chair/Non-slip footwear when patient is out of bed/Hope Hull to call system/Physically safe environment - no spills, clutter or unnecessary equipment/Purposeful Proactive Rounding/Room/bathroom lighting operational, light cord in reach

## 2024-11-05 NOTE — ASU DISCHARGE PLAN (ADULT/PEDIATRIC) - CARE PROVIDER_API CALL
Lucien Carcamo  Interventional Cardiology  [address] [place] [id number]  Phone: [phone]  Fax: [phone]  Established Patient  Follow Up Time: 2 weeks

## 2024-11-05 NOTE — H&P ADULT - HIV OFFER
OFFICE CONSULT - Nephrology   Sg Scherer 77 y o  female MRN: [id number]        ASSESSMENT and PLAN:  Sg was seen today for consult  Diagnoses and all orders for this visit:    Stage 3a chronic kidney disease ([facility] [address] )  -     CBC; Future  -     Renal function panel; Future  -     PTH, intact; Future  -     Microalbumin / creatinine urine ratio; Future    Essential hypertension    Endometrium cancer ([facility] )  -     Ambulatory Referral to Nephrology    Endometrial cancer [facility])  -     Ambulatory Referral to Nephrology    Morbid obesity with BMI of 45 0-49 9, adult [facility])        This is a 67-year-old lady with known history of chronic kidney disease stage 3 as per review of Care everywhere previous creatinine around 1 1-1 4 back since 2018, hypertension for over 20 years, obesity, recently diagnosed 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 after being evaluated for postmenopausal bleeding  Patient was referred to our office for Nephrology evaluation as per protocol for preoperative optimization  1  Chronic kidney disease stage 3  After review of previous records and Care everywhere, noted her creatinine around 1 1-1 4 back since 2018  Most recent creatinine 1 3  CKD suspected secondary to long-term history hypertension, obesity as well as age-related nephron loss  Long discussion with patient and with her sister regarding her underlying kidney disease  Recommend to hold water pills (hydrochlorothiazide) on the day of the procedure at that can be resumed at discharge as long as blood pressure and kidney function remains stable  Okay to keep taking amlodipine  Noted patient is not on Ace or Arb  Recommend avoid NSAIDs  Recommend to follow low-salt diet  Avoid intraoperative hypotension  Follow labs and intravenously fluids postoperatively as per protocol  Consider nephrology consultation during hospitalization if needed  Follow-up in 4 months with repeat labs    2  Opt out Hypertension, blood pressure well controlled on amlodipine and hydrochlorothiazide  As above recommend to hold hydrochlorothiazide on the day of procedure  Follow low-salt diet  3  Postmenopausal breathing with newly diagnosed 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  Plan for hysterectomy as per gynecology Oncology  4  Morbid obesity, advised to lose weight    Patient Instructions   As we discussed in the office visit you have mild-moderate chronic kidney disease for several years  Recommend to hold hydrochlorothiazide (water pill) on the day of the surgery and do not resume it until instructed by primary team as long as your blood pressure and kidney function remains stable  You can continue taking amlodipine 1 tablet daily  It is very important you follow low-salt diet  Avoid NSAIDs (no ibuprofen, Motrin, Advil, Aleve, naproxen)  Okay to take Tylenol or paracetamol or acetaminophen as needed for pain  Would like to see you back in the office in 4 months with repeat labs  Continue close follow-up with your primary care doctor and gynecologist         HPI:  Mikhail Leach is a 77 y  o female who was referred by Dr Coburn for evaluation of Consult    This is a patient with history of chronic kidney disease with creatinine fluctuating around 1 1-1 4 back since 2018 as per Care everywhere, hypertension for over 20 years, obesity, was recently diagnosed with 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, patient was seen by gynecologist oncologist and is scheduled for a hysterectomy, given underlying chronic kidney disease patient was referred to Nephrology as part of protocol for preoperative optimization  Patient today presents to the office with her sister Sukumar Bourgeois  Patient today in general is doing well, denies any significant complaints other than postmenopausal bleeding that started approximately 3-4 weeks ago    She denies any chest pain, no leg swelling, she refers some chronic dyspnea on exertion  Denies any abdominal pain, no nausea, no vomiting, no diarrhea or constipation but she noted some indigestion symptoms on and off  Denies any urinary problems, no burning sensation, no gross hematuria  Denies NSAID use, she only take Tylenol as needed for pain  Denies history of kidney problems, no kidney stones, no family history of kidney disease  Denies tobacco abuse    I personally spent over half of a total 42 minutes face to face with the patient in counseling and discussion and/or coordination of care as described above  ROS: All the systems were reviewed and were negative except as documented on the HPI  Allergies: Patient has no known allergies  Medications:   Current Outpatient Medications:     amLODIPine (NORVASC) 10 mg tablet, Take 10 mg by mouth daily, Disp: , Rfl:     denosumab (Prolia) 60 mg/mL, Inject 1 mL under the skin every 6 (six) months, Disp: , Rfl:     fluticasone (FLONASE) 50 mcg/act nasal spray, 2 sprays into each nostril daily, Disp: , Rfl:     hydrochlorothiazide (HYDRODIURIL) 12 5 mg tablet, Take 12 5 mg by mouth daily, Disp: , Rfl:     omeprazole (PriLOSEC) 40 MG capsule, Take 40 mg by mouth daily, Disp: , Rfl:     Past Medical History:   Diagnosis Date    Arthritis     osteo    Colon polyp     Diverticula of colon     Hypertension     Obesity     Rhinitis      Past Surgical History:   Procedure Laterality Date    APPENDECTOMY      BREAST BIOPSY Left 1977    benign    BREAST SURGERY Left     biopsy    CHOLECYSTECTOMY      COLONOSCOPY      HERNIA REPAIR Right     inguinal    JOINT REPLACEMENT Right     Knee    ME COLONOSCOPY FLX DX W/COLLJ SPEC WHEN PFRMD N/A 2/21/2017    Procedure: COLONOSCOPY with polypectomy and hemo clip marjan ;  Surgeon: Albaro Cao MD;  Location: [facility];   Service: Gastroenterology     Family History   Problem Relation Age of Onset    No Known Problems Mother  Prostate cancer Father 80    No Known Problems Sister     No Known Problems Maternal Grandmother     No Known Problems Maternal Grandfather     No Known Problems Paternal Grandmother     No Known Problems Paternal Grandfather     No Known Problems Sister     No Known Problems Sister     No Known Problems Maternal Aunt     No Known Problems Paternal Aunt     No Known Problems Paternal Aunt     No Known Problems Paternal Aunt       reports that she has never smoked  She has never used smokeless tobacco  She reports current alcohol use of about 1 0 standard drink of alcohol per week  She reports that she does not use drugs  Physical Exam:   Vitals:    03/21/22 1418   BP: 135/82   BP Location: Left arm   Patient Position: Sitting   Cuff Size: Standard   Pulse: 98   Weight: 122 kg (269 lb)   Height: 5' 2 5" (1 588 m)     Body mass index is 48 42 kg/m²  General:  Morbidly obese, conscious, cooperative, in not acute distress  Eyes: conjunctivae pink, anicteric sclerae  ENT: lips and mucous membranes moist  Neck: supple, no JVD  Chest: clear breath sounds bilateral, no crackles, ronchus or wheezings  CVS: distinct S1 & S2, normal rate, regular rhythm  Abdomen:  Obese, non-tender, non-distended, normoactive bowel sounds  Back: no CVA tenderness  Extremities: no edema of both legs  Skin: no rash  Neuro: awake, alert, oriented      Laboratory Results:  Lab Results   Component Value Date    WBC 4 36 03/10/2022    HGB 12 2 03/10/2022    HCT 39 0 03/10/2022    MCV 98 03/10/2022     03/10/2022     Lab Results   Component Value Date    SODIUM 143 03/10/2022    K 3 6 03/10/2022     03/10/2022    CO2 28 03/10/2022    BUN 12 03/10/2022    CREATININE 1 30 03/10/2022    CALCIUM 8 1 (L) 03/10/2022         Portions of the record may have been created with voice recognition software   Occasional wrong word or "sound a like" substitutions may have occurred due to the inherent limitations of voice recognition software  Read the chart carefully and recognize, using context, where substitutions have occurred  If you have any questions, please contact the dictating provider

## 2024-11-05 NOTE — H&P ADULT - HISTORY OF PRESENT ILLNESS
69yr old male PMH HTN, BPH, +family history of CAD,  lung cancer( partial lobectomy), shoulder replacement, seen by his cardiologist for cardiac clearance for hernia repair. Pt. had a PET stress test which revealed large territories of ischemia of moderate intensity to anterior anteroseptal and inferolateral walls. Pt. now referred for [facility] for further evaluation.

## 2024-11-05 NOTE — PACU DISCHARGE NOTE - COMMENTS
patient discharged home. wife present at bedside for discharge instructions. pt to start crestor as per dr noe. patient verbalized he is apprehensive and will follow up with dr noe in the office. np adilene aware. iv lock removed. patient verbalized and understanding of all discharge instructions and given a paper copy. dry dressing to right groin. no active signs of bleeding/hematoma.

## 2024-11-05 NOTE — ASU PATIENT PROFILE, ADULT - VISION (WITH CORRECTIVE LENSES IF THE PATIENT USUALLY WEARS THEM):
reading glasses left with wife/Partially impaired: cannot see medication labels or newsprint, but can see obstacles in path, and the surrounding layout; can count fingers at arm's length

## 2024-11-05 NOTE — ASU PREOP CHECKLIST - HEIGHT IN CM
175.26 Lip Wedge Excision Repair Text: Given the location of the defect and the proximity to free margins a full thickness wedge repair was deemed most appropriate. Using a sterile surgical marker, the appropriate repair was drawn incorporating the defect and placing the expected incisions perpendicular to the vermilion border.  The vermilion border was also meticulously outlined to ensure appropriate reapproximation during the repair.  The area thus outlined was incised through and through with a #15 scalpel blade.  The muscularis and dermis were reaproximated with deep sutures following hemostasis. Care was taken to realign the vermilion border before proceeding with the superficial closure.  Once the vermilion was realigned the superfical and mucosal closure was finished.

## 2024-11-05 NOTE — H&P ADULT - ASSESSMENT
69yr old male PMH HTN, BPH, +family history of CAD,  lung cancer( partial lobectomy), shoulder replacement, seen by his cardiologist for cardiac clearance for hernia repair. Pt. had a PET stress test which revealed large territories of ischemia of moderate intensity to anterior anteroseptal and inferolateral walls. Pt. now referred for [facility] for further evaluation.   Consent obtained for and signed for cardiac cath with coronary angiogram and possible stent placement with possible sedation and analgesia. C risks, benefits and alternatives discussed with patient. Risk discussed included, but not limited to MI, stroke, mortality, major bleeding, arrythmia, or infection, educational material provided. Pt. verbalizes and understands pre-procedural instructions.   ASA:II  Bleeding Risk Score: 0.9  Creatinine: 0.97  GFR:  85  Robert Score: 1 point  Pt. was pre-hydrated with sodium chloride 0.9% 250cc bolus IV x1

## 2024-11-05 NOTE — ASU DISCHARGE PLAN (ADULT/PEDIATRIC) - ASU DC SPECIAL INSTRUCTIONSFT
new medication: Crestor 5mg po daily     follow up with Dr. Carcamo in 1-2 weeks to discuss new medications

## 2024-11-05 NOTE — H&P ADULT - NSHPLABSRESULTS_GEN_ALL_CORE
PET stress test  revealed large territories of ischemia of moderate intensity to anterior anteroseptal and inferolateral walls.

## 2024-11-05 NOTE — PROGRESS NOTE ADULT - SUBJECTIVE AND OBJECTIVE BOX
Department of Cardiology                                                               Division of Interventional Cardiology                                                               [facility] /[address] [address]                                                                                 [phone]           Post Procedure Progress Note  Patient is a 69y old  Male who presents with a chief complaint of LHC (05 Nov 2024 12:23)      Patient is  now s/p left heart catheterization    s/p LHC : revealing 2VD disease   Pt denies chest pain/SOB/palpitations post cath.  PROCEDURE SITE: --RFA accessed; closed via angioseal. RRA unable to cannulate -- Site is without hematoma or bleeding. Sensation and SOMMER intact. Distal pulses palpable 2+, capillary refill < 2 seconds.       Vital Signs  T(C): 36.5 (11-05-24 @ 11:25), Max: 36.5 (11-05-24 @ 11:25)  HR: 58 (11-05-24 @ 11:25) (58 - 58)  BP: 170/86 (11-05-24 @ 11:25) (170/86 - 170/86)  RR: 16 (11-05-24 @ 11:25) (16 - 16)  SpO2: 100% (11-05-24 @ 11:25) (100% - 100%)  Wt(kg): --    Home Medications:  aspirin 81 mg oral tablet: 1 tab(s) orally once a day (05 Nov 2024 11:25)  gabapentin 300 mg oral tablet: orally once a day (05 Nov 2024 11:25)  gabapentin 600 mg oral tablet: 1 tab(s) orally once a day (at bedtime) (05 Nov 2024 11:24)  losartan 100 mg oral tablet: 1 tab(s) orally once a day (05 Nov 2024 11:25)  Metoprolol Succinate  mg oral tablet, extended release: 1 tab(s) orally once a day (05 Nov 2024 11:25)  tamsulosin 0.4 mg oral capsule: 1 cap(s) orally once a day (at bedtime) (05 Nov 2024 11:22)      PHYSICAL EXAM:  NEURO: Non-focal, AxOx3.  No neuro deficits   CHEST/LUNG: Clear to auscultation bilaterally; No wheeze  HEART: s1 s2 Regular rate and rhythm; No murmurs, rubs, or gallops  ABDOMEN: Soft, Nontender, Nondistended; Bowel sounds present X 4 quadrants   EXTREMITIES:  2+ Peripheral Pulses, No clubbing, cyanosis, or edema   VASCULAR: Peripheral pulses palpable 2+ bilaterally      PROCEDURE RESULTS:  full report to follow               PLAN:  -VS, diet, activity as per post cath orders  - IVF per ANNE protocol   -Encourage PO fluids  -Continue current medications  - start Crestor 5mg daily at hs   -Post cath instructions reviewed, post sedation instructions reviewed; patient verbalizes and understands instructions  -Discussed therapeutic lifestyle changes to reduce risk factors such as following a cardiac diet, weight loss, maintaining a healthy weight, exercise, smoking cessation, medication compliance, and regular follow-up  with PCP/Cardioloigst  - Patient to be discharged home, recommend following up with cardiologist in 2 weeks  -Plan of care discussed with patient, RN and Dr. Harper

## 2024-11-05 NOTE — ASU DISCHARGE PLAN (ADULT/PEDIATRIC) - NS MD DC FALL RISK RISK
For information on Fall & Injury Prevention, visit: [email or web address] OR  [email or web address] OR  https://www.cdc.gov/steadi/patient.html

## 2024-11-05 NOTE — ASU DISCHARGE PLAN (ADULT/PEDIATRIC) - FINANCIAL ASSISTANCE
[facility] provides services at a reduced cost to those who are determined to be eligible through [facility]’s financial assistance program. Information regarding [facility]’s financial assistance program can be found by going to [email or web address] or by calling [phone].

## 2024-11-06 ENCOUNTER — APPOINTMENT (OUTPATIENT)
Dept: UROLOGY | Facility: CLINIC | Age: 69
End: 2024-11-06
Payer: MEDICARE

## 2024-11-06 VITALS
SYSTOLIC BLOOD PRESSURE: 144 MMHG | OXYGEN SATURATION: 97 % | WEIGHT: 190 LBS | HEART RATE: 65 BPM | HEIGHT: 69 IN | BODY MASS INDEX: 28.14 KG/M2 | RESPIRATION RATE: 16 BRPM | DIASTOLIC BLOOD PRESSURE: 89 MMHG

## 2024-11-06 DIAGNOSIS — N52.9 MALE ERECTILE DYSFUNCTION, UNSPECIFIED: ICD-10-CM

## 2024-11-06 DIAGNOSIS — N40.1 BENIGN PROSTATIC HYPERPLASIA WITH LOWER URINARY TRACT SYMPMS: ICD-10-CM

## 2024-11-06 DIAGNOSIS — R97.20 ELEVATED PROSTATE, SPECIFIC ANTIGEN [PSA]: ICD-10-CM

## 2024-11-06 PROCEDURE — 99214 OFFICE O/P EST MOD 30 MIN: CPT

## 2024-11-06 NOTE — POST DISCHARGE NOTE - DETAILS:
Post procedure phone call completed; patient understood all discharge paperwork. No questions regarding medications or pain management. MD follow up appointment made. Patient was able to rest when they were discharged. Patient will recommend [facility], no complaints of hospital stay, satisfied with care. Instructed patient to contact provider with any further questions or concerns.

## 2024-11-07 DIAGNOSIS — I25.10 ATHEROSCLEROTIC HEART DISEASE OF NATIVE CORONARY ARTERY WITHOUT ANGINA PECTORIS: ICD-10-CM

## 2024-11-07 LAB
PSA FREE FLD-MCNC: 13 %
PSA FREE SERPL-MCNC: 0.62 NG/ML
PSA SERPL-MCNC: 4.85 NG/ML

## 2024-11-26 ENCOUNTER — OUTPATIENT (OUTPATIENT)
Dept: OUTPATIENT SERVICES | Facility: HOSPITAL | Age: 69
LOS: 1 days | End: 2024-11-26
Payer: MEDICARE

## 2024-11-26 VITALS
OXYGEN SATURATION: 99 % | HEART RATE: 60 BPM | RESPIRATION RATE: 17 BRPM | HEIGHT: 69 IN | DIASTOLIC BLOOD PRESSURE: 73 MMHG | SYSTOLIC BLOOD PRESSURE: 127 MMHG | TEMPERATURE: 99 F | WEIGHT: 194.01 LBS

## 2024-11-26 DIAGNOSIS — Z85.07 PERSONAL HISTORY OF MALIGNANT NEOPLASM OF PANCREAS: Chronic | ICD-10-CM

## 2024-11-26 DIAGNOSIS — Z01.818 ENCOUNTER FOR OTHER PREPROCEDURAL EXAMINATION: ICD-10-CM

## 2024-11-26 DIAGNOSIS — Z96.611 PRESENCE OF RIGHT ARTIFICIAL SHOULDER JOINT: Chronic | ICD-10-CM

## 2024-11-26 DIAGNOSIS — Z90.2 ACQUIRED ABSENCE OF LUNG [PART OF]: Chronic | ICD-10-CM

## 2024-11-26 LAB
ANION GAP SERPL CALC-SCNC: 1 MMOL/L — LOW (ref 5–17)
APTT BLD: 29.2 SEC — SIGNIFICANT CHANGE UP (ref 24.5–35.6)
BASOPHILS # BLD AUTO: 0.05 K/UL — SIGNIFICANT CHANGE UP (ref 0–0.2)
BASOPHILS NFR BLD AUTO: 1 % — SIGNIFICANT CHANGE UP (ref 0–2)
BUN SERPL-MCNC: 16 MG/DL — SIGNIFICANT CHANGE UP (ref 7–23)
CALCIUM SERPL-MCNC: 9.6 MG/DL — SIGNIFICANT CHANGE UP (ref 8.5–10.1)
CHLORIDE SERPL-SCNC: 109 MMOL/L — HIGH (ref 96–108)
CO2 SERPL-SCNC: 29 MMOL/L — SIGNIFICANT CHANGE UP (ref 22–31)
CREAT SERPL-MCNC: 1.04 MG/DL — SIGNIFICANT CHANGE UP (ref 0.5–1.3)
EGFR: 78 ML/MIN/1.73M2 — SIGNIFICANT CHANGE UP
EOSINOPHIL # BLD AUTO: 0.18 K/UL — SIGNIFICANT CHANGE UP (ref 0–0.5)
EOSINOPHIL NFR BLD AUTO: 3.5 % — SIGNIFICANT CHANGE UP (ref 0–6)
GLUCOSE SERPL-MCNC: 99 MG/DL — SIGNIFICANT CHANGE UP (ref 70–99)
HCT VFR BLD CALC: 39.8 % — SIGNIFICANT CHANGE UP (ref 39–50)
HGB BLD-MCNC: 13.4 G/DL — SIGNIFICANT CHANGE UP (ref 13–17)
IMM GRANULOCYTES NFR BLD AUTO: 0.2 % — SIGNIFICANT CHANGE UP (ref 0–0.9)
INR BLD: 1.09 RATIO — SIGNIFICANT CHANGE UP (ref 0.85–1.16)
LYMPHOCYTES # BLD AUTO: 1.38 K/UL — SIGNIFICANT CHANGE UP (ref 1–3.3)
LYMPHOCYTES # BLD AUTO: 27.1 % — SIGNIFICANT CHANGE UP (ref 13–44)
MCHC RBC-ENTMCNC: 29.8 PG — SIGNIFICANT CHANGE UP (ref 27–34)
MCHC RBC-ENTMCNC: 33.7 G/DL — SIGNIFICANT CHANGE UP (ref 32–36)
MCV RBC AUTO: 88.4 FL — SIGNIFICANT CHANGE UP (ref 80–100)
MONOCYTES # BLD AUTO: 0.56 K/UL — SIGNIFICANT CHANGE UP (ref 0–0.9)
MONOCYTES NFR BLD AUTO: 11 % — SIGNIFICANT CHANGE UP (ref 2–14)
MRSA PCR RESULT.: SIGNIFICANT CHANGE UP
NEUTROPHILS # BLD AUTO: 2.92 K/UL — SIGNIFICANT CHANGE UP (ref 1.8–7.4)
NEUTROPHILS NFR BLD AUTO: 57.2 % — SIGNIFICANT CHANGE UP (ref 43–77)
PLATELET # BLD AUTO: 265 K/UL — SIGNIFICANT CHANGE UP (ref 150–400)
POTASSIUM SERPL-MCNC: 4.1 MMOL/L — SIGNIFICANT CHANGE UP (ref 3.5–5.3)
POTASSIUM SERPL-SCNC: 4.1 MMOL/L — SIGNIFICANT CHANGE UP (ref 3.5–5.3)
PROTHROM AB SERPL-ACNC: 12.8 SEC — SIGNIFICANT CHANGE UP (ref 9.9–13.4)
RBC # BLD: 4.5 M/UL — SIGNIFICANT CHANGE UP (ref 4.2–5.8)
RBC # FLD: 13.7 % — SIGNIFICANT CHANGE UP (ref 10.3–14.5)
S AUREUS DNA NOSE QL NAA+PROBE: DETECTED
SODIUM SERPL-SCNC: 139 MMOL/L — SIGNIFICANT CHANGE UP (ref 135–145)
WBC # BLD: 5.1 K/UL — SIGNIFICANT CHANGE UP (ref 3.8–10.5)
WBC # FLD AUTO: 5.1 K/UL — SIGNIFICANT CHANGE UP (ref 3.8–10.5)

## 2024-11-26 PROCEDURE — 99214 OFFICE O/P EST MOD 30 MIN: CPT | Mod: 25

## 2024-11-26 PROCEDURE — 85610 PROTHROMBIN TIME: CPT

## 2024-11-26 PROCEDURE — 85025 COMPLETE CBC W/AUTO DIFF WBC: CPT

## 2024-11-26 PROCEDURE — 87640 STAPH A DNA AMP PROBE: CPT

## 2024-11-26 PROCEDURE — 36415 COLL VENOUS BLD VENIPUNCTURE: CPT

## 2024-11-26 PROCEDURE — 87641 MR-STAPH DNA AMP PROBE: CPT

## 2024-11-26 PROCEDURE — 80048 BASIC METABOLIC PNL TOTAL CA: CPT

## 2024-11-26 PROCEDURE — 85730 THROMBOPLASTIN TIME PARTIAL: CPT

## 2024-11-26 NOTE — H&P PST ADULT - ASSESSMENT
61 year old female who is scheduled to have bilateral inguinal hernia repair with mesh - robotic with Dr. Bustamante       Plan:  1. PST instructions given ; NPO status/ instructions to be given by ASU   2. Pt instructed to take following meds on day of surgery:   3. Pt instructed to take routine evening medications unless indicated   4. Stop NSAIDS ( Aspirin, Aleve, Motrin, Mobic, Diclofenac), herbal supplements, MVI, Vitamins, fish oil 7 days prior to surgery unless directed by surgeon or cardiologist;   5. Medical Optimization with Dr Baxter.. cardiac optimization with    6. EZ wash instructions given & mupirocin instructions given.  7. Labs, EKG as per surgeon request      61 year old female who is scheduled to have bilateral inguinal hernia repair with mesh - robotic with Dr. Bustamante       Plan:  1. PST instructions given ; NPO status/ instructions to be given by ASU   2. Pt instructed to take following meds on day of surgery: gabapentin   -Pt instructed to hold losartan morning of surgery   3. Pt instructed to take routine evening medications unless indicated   4. Stop NSAIDS ( Aspirin, Aleve, Motrin, Mobic, Diclofenac), herbal supplements, MVI, Vitamins, fish oil 7 days prior to surgery unless directed by surgeon or cardiologist;   5. Medical Optimization with Dr. Calderon  cardiac optimization with Dr. Carcamo  6. EZ wash instructions given & mupirocin instructions given.  7. Labs as per surgeon request. EKG obtained from cardiologist      61 year old male who is scheduled to have bilateral inguinal hernia repair with mesh - robotic with Dr. Bustamante       Plan:  1. PST instructions given ; NPO status/ instructions to be given by ASU   2. Pt instructed to take following meds on day of surgery: gabapentin   -Pt instructed to hold losartan morning of surgery   3. Pt instructed to take routine evening medications unless indicated   4. Stop NSAIDS ( Aspirin, Aleve, Motrin, Mobic, Diclofenac), herbal supplements, MVI, Vitamins, fish oil 7 days prior to surgery unless directed by surgeon or cardiologist;   5. Medical Optimization with Dr. Calderon  cardiac optimization with Dr. Carcamo  6. EZ wash instructions given & mupirocin instructions given.  7. Labs as per surgeon request. EKG obtained from cardiologist

## 2024-11-26 NOTE — H&P PST ADULT - CARDIOVASCULAR COMMENTS
Hx: HTN, HLD, CAD - cardiac optimization obtained. Abnormal stress test s/p cardiac cath 11/2024 - no stents

## 2024-11-26 NOTE — H&P PST ADULT - ENMT
"If a COVID swab was performed:   Please quarantine until results return.     -If \"NEGATIVE\" and symptoms improving (without need for medication) you are able to return to work/activities of daily limit    -If \"POSITIVE\" please quarantine for 10-14 days from symptom onset    Please refer to your AVS for follow up and pain/symptoms management recommendations (I.e.: medications, helpful conservative treatment modalities, appropriate follow up if need to a specialist or family practice, etc.). Please return to urgent care if your symptoms change or worsen.     Discharge instructions:  -If you were prescribed a medication(s), please take this as prescribed/directed  -Monitor your symptoms, if changing/worsening, return to UC/ER or PCP for follow up    Symptomatic treatments recommended.  - Antibiotics will not help with your symptoms, unless you were told otherwise today (strep throat, ear infection, etc. ). Education provided on symptoms of secondary bacterial infection such as new fever, chills, rigors, shortness of breath, increased work of breathing, that can occur with viral URI and need for further evaluation, if they occur.   - Ensure you are staying hydrated by drinking plenty of fluids and eating mild foods and advance diet as tolerated  - Honey can be soothing for sore throat (as long as above 12 months of age)  - Warm salt water gurgles can help soothe sore throat  - Humidifier can help with congestion and help keep mucus membranes such as throat and nose from drying out.  - Sleeping slightly propped up can help with congestion and postnasal drainage that can worsen cough at bedtime.  - As long as you have never been told to take Tylenol and/or Ibuprofen you can use them to manage fever and body aches per package instructions  Make sure you eat when you take ibuprofen to avoid stomach upset.  - OTC cough medications per package instructions to help with cough. Check to see if the cough/cold medication already " has acetaminophen (Tylenol) in it. If it does avoid taking additional Tylenol.  - If sudden onset of new fever, worsening symptoms return for further evaluation.  - OTC antihistamine such as Allegra, Zyrtec, Claritin (generic is okay) can help with nasal/sinus congestion and OTC nasal steroid such as Flonase can help decrease sinus inflammation to help with congestion.  - Education provided on symptoms of post-viral bacterial infections including ear infection and pneumonia. This would require re-evaluation for treatment.     negative no gross abnormalities

## 2024-11-26 NOTE — H&P PST ADULT - SUBSTANCE USE COMMENT, PROFILE
Diet, Regular:   DASH/TLC {Sodium & Cholesterol Restricted}  Soft and Bite Sized (SOFTBTSZ) (03-13-24 @ 21:15)   denies illicit drug use

## 2024-11-26 NOTE — H&P PST ADULT - NSICDXPASTMEDICALHX_GEN_ALL_CORE_FT
PAST MEDICAL HISTORY:  Bilateral inguinal hernia     Bilateral inguinal hernia     BPH (benign prostatic hyperplasia)     CAD (coronary artery disease)     High cholesterol     Hypertension     Lung cancer

## 2024-11-26 NOTE — H&P PST ADULT - HISTORY OF PRESENT ILLNESS
69 year old female who presents to [facility] with chief complaint of bilateral inguinal hernia.    She is scheduled to have bilateral inguinal hernia repair with mesh - robotic.  69 year old female who presents to [facility] with chief complaint of bilateral inguinal hernia. Patient explains how he noticed hernia approximately 1.5 years ago. Bilateral hernias were found on CT scan during routine imaging for lung cancer. He was referred to Dr. Bustamante by his urologist and surgery was recommended. Patient denies pain or discomfort, N/V/D, constipation, fever and chills. She is scheduled to have bilateral inguinal hernia repair with mesh - robotic.  69 year old male who presents to PST with chief complaint of bilateral inguinal hernia. Patient explains how he noticed hernia approximately 1.5 years ago. Bilateral hernias were found on CT scan during routine imaging for lung cancer. He was referred to Dr. Bustamante by his urologist and surgery was recommended. Patient denies pain or discomfort, N/V/D, constipation, fever and chills. She is scheduled to have bilateral inguinal hernia repair with mesh - robotic.

## 2024-11-26 NOTE — H&P PST ADULT - NSICDXFAMILYHX_GEN_ALL_CORE_FT
FAMILY HISTORY:  Father  Still living? No  FH: dementia, Age at diagnosis: Age Unknown    Mother  Still living? Unknown  Family history of glioblastoma, Age at diagnosis: Age Unknown    Sibling  Still living? No  FH: breast cancer, Age at diagnosis: Age Unknown  FH: leukemia, Age at diagnosis: Age Unknown  FH: pancreatic cancer, Age at diagnosis: Age Unknown

## 2024-11-27 DIAGNOSIS — Z01.818 ENCOUNTER FOR OTHER PREPROCEDURAL EXAMINATION: ICD-10-CM

## 2024-11-27 NOTE — CHART NOTE - NSCHARTNOTEFT_GEN_A_CORE
MSSA detected from nasal swab done in PST on 11/26/24. Patient instructed to apply Mupirocin to nostrils 2 times per day for 5 days starting 12/4/24. Patient was able to verbalize instructions. Questions answered.

## 2024-12-09 ENCOUNTER — OUTPATIENT (OUTPATIENT)
Dept: INPATIENT UNIT | Facility: HOSPITAL | Age: 69
LOS: 1 days | Discharge: HOME | End: 2024-12-09
Payer: MEDICARE

## 2024-12-09 ENCOUNTER — TRANSCRIPTION ENCOUNTER (OUTPATIENT)
Age: 69
End: 2024-12-09

## 2024-12-09 VITALS
DIASTOLIC BLOOD PRESSURE: 89 MMHG | HEART RATE: 70 BPM | SYSTOLIC BLOOD PRESSURE: 141 MMHG | HEIGHT: 68 IN | WEIGHT: 194.01 LBS | TEMPERATURE: 98 F | RESPIRATION RATE: 16 BRPM

## 2024-12-09 VITALS — TEMPERATURE: 98 F

## 2024-12-09 DIAGNOSIS — Z96.611 PRESENCE OF RIGHT ARTIFICIAL SHOULDER JOINT: Chronic | ICD-10-CM

## 2024-12-09 DIAGNOSIS — K40.20 BILATERAL INGUINAL HERNIA, WITHOUT OBSTRUCTION OR GANGRENE, NOT SPECIFIED AS RECURRENT: ICD-10-CM

## 2024-12-09 DIAGNOSIS — Z90.2 ACQUIRED ABSENCE OF LUNG [PART OF]: Chronic | ICD-10-CM

## 2024-12-09 PROCEDURE — C9290: CPT

## 2024-12-09 PROCEDURE — S2900: CPT

## 2024-12-09 PROCEDURE — C1781: CPT

## 2024-12-09 PROCEDURE — C9399: CPT

## 2024-12-09 RX ORDER — ACETAMINOPHEN 500MG 500 MG/1
1000 TABLET, COATED ORAL ONCE
Refills: 0 | Status: DISCONTINUED | OUTPATIENT
Start: 2024-12-09 | End: 2024-12-09

## 2024-12-09 RX ORDER — MULTIVIT WITH MINERALS/LUTEIN
1 TABLET ORAL
Refills: 0 | DISCHARGE

## 2024-12-09 RX ORDER — 0.9 % SODIUM CHLORIDE 0.9 %
1000 INTRAVENOUS SOLUTION INTRAVENOUS
Refills: 0 | Status: DISCONTINUED | OUTPATIENT
Start: 2024-12-09 | End: 2024-12-09

## 2024-12-09 RX ORDER — ENOXAPARIN SODIUM 30 MG/.3ML
40 INJECTION SUBCUTANEOUS EVERY 24 HOURS
Refills: 0 | Status: DISCONTINUED | OUTPATIENT
Start: 2024-12-10 | End: 2024-12-09

## 2024-12-09 RX ORDER — PANTOPRAZOLE SODIUM 40 MG/1
40 TABLET, DELAYED RELEASE ORAL
Refills: 0 | Status: DISCONTINUED | OUTPATIENT
Start: 2024-12-09 | End: 2024-12-09

## 2024-12-09 RX ORDER — ONDANSETRON HYDROCHLORIDE 4 MG/1
4 TABLET, FILM COATED ORAL ONCE
Refills: 0 | Status: DISCONTINUED | OUTPATIENT
Start: 2024-12-09 | End: 2024-12-09

## 2024-12-09 RX ORDER — FENTANYL 12 UG/H
50 PATCH, EXTENDED RELEASE TRANSDERMAL
Refills: 0 | Status: DISCONTINUED | OUTPATIENT
Start: 2024-12-09 | End: 2024-12-09

## 2024-12-09 RX ORDER — OXYCODONE HYDROCHLORIDE 30 MG/1
5 TABLET ORAL ONCE
Refills: 0 | Status: DISCONTINUED | OUTPATIENT
Start: 2024-12-09 | End: 2024-12-09

## 2024-12-09 RX ORDER — OXYCODONE AND ACETAMINOPHEN 5; 325 MG/1; MG/1
1 TABLET ORAL
Qty: 12 | Refills: 0
Start: 2024-12-09 | End: 2024-12-11

## 2024-12-09 RX ORDER — OXYCODONE HYDROCHLORIDE 30 MG/1
10 TABLET ORAL ONCE
Refills: 0 | Status: DISCONTINUED | OUTPATIENT
Start: 2024-12-09 | End: 2024-12-09

## 2024-12-09 RX ORDER — GABAPENTIN 300 MG/1
1 CAPSULE ORAL
Refills: 0 | DISCHARGE

## 2024-12-09 RX ORDER — GABAPENTIN 300 MG/1
3 CAPSULE ORAL
Refills: 0 | DISCHARGE

## 2024-12-09 NOTE — ASU DISCHARGE PLAN (ADULT/PEDIATRIC) - CALL YOUR DOCTOR IF YOU HAVE ANY OF THE FOLLOWING:
Goal Outcome Evaluation:  Plan of Care Reviewed With: (P) patient        Progress: (P) no change  Outcome Summary: VSS. remains on 4L NC. mild heartburn treated with protonix per MD order. denies any pain. Stress test to be done today. will continue to montior.   Pain not relieved by Medications/Fever greater than (need to indicate Fahrenheit or Celsius)/Nausea and vomiting that does not stop

## 2024-12-09 NOTE — ASU DISCHARGE PLAN (ADULT/PEDIATRIC) - FINANCIAL ASSISTANCE
[facility] provides services at a reduced cost to those who are determined to be eligible through [facility]’s financial assistance program. Information regarding [facility]’s financial assistance program can be found by going to [email or web address] or by calling [phone].

## 2024-12-09 NOTE — BRIEF OPERATIVE NOTE - NSICDXBRIEFPROCEDURE_GEN_ALL_CORE_FT
PROCEDURES:  Robot-assisted bilateral repair of inguinal hernia using da Shakira Si 09-Dec-2024 10:50:03  Arleen Dover  Repair, hernia, umbilical, with lipectomy 09-Dec-2024 10:50:20  Arleen Dover H

## 2024-12-09 NOTE — ASU DISCHARGE PLAN (ADULT/PEDIATRIC) - CARE PROVIDER_API CALL
Tulio Bustamante  Surgery  [address] [place] [address], [address]  [place] [address]  Phone: [phone]  Fax: [phone]  Follow Up Time:

## 2024-12-09 NOTE — CHART NOTE - NSCHARTNOTEFT_GEN_A_CORE
DATE OF OPERATION: 12-9-24    SURGEON: Tulio Bustamante MD    ASSISTANT: Emir Oliva MD    PREOPERATIVE DIAGNOSIS: Bilateral Inguinal Hernia     POSTOPERATIVE DIAGNOSIS: Same    PROCEDURES: Robotically-assisted laparoscopic bilateral inguinal hernia repair with mesh, bilateral transabdominal plane block    ANESTHESIA: GETA    ESTIMATED BLOOD LOSS: 20cc    COMPLICATIONS: None    DRAINS: No Drains    DESCRIPTION OF THE PROCEDURE: The patient was taken to the operating room. Time-outs were performed both preinduction and pre-incision safety checklist to verify correct patient, procedure site, and additional critical information prior to beginning the procedure.  The patient was placed supine with arms tucked at the sides. After obtaining adequate anesthesia, the patient's abdomen was prepped and draped in the usual sterile fashion.  The patient was placed in Trendelenburg position, a Veress needle was placed at Kraft's point. Pneumoperitoneum was then created with insufflation of carbon dioxide to  15 mmHg.  After the Veress needle produced insufflation, incision was made within the umbilicus and an 8-mm trocar was placed through the umbilicus and a 30 robotic laparoscope was then placed into the abdomen. We then conducted our TAP block, which was prepared at the back table.  The Exparel mixture was mixed at the back table with 20 mL of Exparel, 30 mL of 0.25% Marcaine and 150 ml of normal saline. The injections were then conducted by placing 20 ml of the Exparel mixture preperitoneal at the distribution of the spinal nerves on the lateral abdominal wall. This was started at the costal margin on the mid axillary line. 20 mL of mixture was placed in this area. 20 mL was then placed 4 cm caudal to this area, another 20 mL was placed 4cm caudal to this area and then the last 20 mL was placed 2 cm anterior to the anterior-superior iliac spine.  This was repeated on the opposite side of the body in a similar fashion.  The rest of the Exparel was placed into the subcutaneous tissues and preperitoneal space at the trocars. Two 8-mm robotic trocars were placed lateral to the rectus sheath under direct visualization. Both inguinal hernia regions were inspected and both sides had inguinal hernias. We then connected the robot to the robotic trocars. The medial umbilical ligament was then divided sharply with electrocautery to achieve optimal exposure. The peritoneum was incised with endoscopic scissors along the line 2 cm above the superior edge of the hernia defect extending from the medial umbilical ligament to the anterior-superior iliac spine.  The peritoneal flap was mobilized inferiorly using blunt and sharp dissection. The inferior epigastric vessels were exposed and the pubic symphysis was identified. There was a pantaloon hernia identified.  The indirect sac was reduced into the peritoneal cavity. The cord structures were left intact and the sac was dissected away from the cord structures. Once the hernia was completely dissected out away from the cord structures, we then placed a large piece 15x10 of ProGrip inguinal hernia mesh in the right side, and this was passed through an 8-m trocar placed it completely to cover the direct, indirect and femoral spaces.  It was unrolled. The mesh did not need to be tacked as it is a ProGrip mesh, it  onto the tissue and left the mesh in place medially to Ricardo's ligament. After ensuring that the mesh was in good position, the peritoneal flap was closed over the mesh and this was closed with 3-0 absorbable V-Loc suture using the robot to suture the entire flap.  The left side hernia was completed in a similar fashion.  The hernia on the left side was much smaller and just had a lipoma of the cord on the left side. After ensuring adequate hemostasis, the trocars were removed and the skin was closed with 4-0 Monocryl. Dermabond was applied over that. The patient tolerated the procedure well and brought to the recovery room in stable condition. DATE OF OPERATION: 12-9-24    SURGEON: Tulio Bustamante MD    ASSISTANT: Emir Oliva MD    PREOPERATIVE DIAGNOSIS: Bilateral Inguinal Hernia     POSTOPERATIVE DIAGNOSIS: Same, Umbilical Hernia    PROCEDURES: Robotically-assisted laparoscopic bilateral inguinal hernia repair with mesh, bilateral transabdominal plane block, Repair of umbilical hernia    ANESTHESIA: GETA    ESTIMATED BLOOD LOSS: 20cc    COMPLICATIONS: None    DRAINS: No Drains    DESCRIPTION OF THE PROCEDURE: The patient was taken to the operating room. Time-outs were performed both preinduction and pre-incision safety checklist to verify correct patient, procedure site, and additional critical information prior to beginning the procedure.  The patient was placed supine with arms tucked at the sides. After obtaining adequate anesthesia, the patient's abdomen was prepped and draped in the usual sterile fashion.  The patient was placed in Trendelenburg position, a Veress needle was placed at Kraft's point. Pneumoperitoneum was then created with insufflation of carbon dioxide to  15 mmHg.  After the Veress needle produced insufflation, incision was made within the umbilicus and an 8-mm trocar was placed through the umbilical hernia and a 30 robotic laparoscope was then placed into the abdomen. We then conducted our TAP block, which was prepared at the back table.  The Exparel mixture was mixed at the back table with 20 mL of Exparel, 30 mL of 0.25% Marcaine and 150 ml of normal saline. The injections were then conducted by placing 20 ml of the Exparel mixture preperitoneal at the distribution of the spinal nerves on the lateral abdominal wall. This was started at the costal margin on the mid axillary line. 20 mL of mixture was placed in this area. 20 mL was then placed 4 cm caudal to this area, another 20 mL was placed 4cm caudal to this area and then the last 20 mL was placed 2 cm anterior to the anterior-superior iliac spine.  This was repeated on the opposite side of the body in a similar fashion.  The rest of the Exparel was placed into the subcutaneous tissues and preperitoneal space at the trocars. Two 8-mm robotic trocars were placed lateral to the rectus sheath under direct visualization. Both inguinal hernia regions were inspected and both sides had inguinal hernias. We then connected the robot to the robotic trocars. The medial umbilical ligament was then divided sharply with electrocautery to achieve optimal exposure. The peritoneum was incised with endoscopic scissors along the line 2 cm above the superior edge of the hernia defect extending from the medial umbilical ligament to the anterior-superior iliac spine.  The peritoneal flap was mobilized inferiorly using blunt and sharp dissection. The inferior epigastric vessels were exposed and the pubic symphysis was identified. There was a pantaloon hernia identified.  The indirect sac was reduced into the peritoneal cavity. The cord structures were left intact and the sac was dissected away from the cord structures. Once the hernia was completely dissected out away from the cord structures, we then placed a large piece 15x10 of ProGrip inguinal hernia mesh in the right side, and this was passed through an 8-m trocar placed it completely to cover the direct, indirect and femoral spaces.  It was unrolled. The mesh did not need to be tacked as it is a ProGrip mesh, it  onto the tissue and left the mesh in place medially to Ricardo's ligament. After ensuring that the mesh was in good position, the peritoneal flap was closed over the mesh and this was closed with 3-0 absorbable V-Loc suture using the robot to suture the entire flap.  The left side hernia was completed in a similar fashion.  The hernia on the left side was much smaller and just had a lipoma of the cord on the left side. After ensuring adequate hemostasis, the trocars were removed.  The umbilcal hernia was closed with a 0 PDS.  The skin was closed with 4-0 Monocryl. Dermabond was applied over that. The patient tolerated the procedure well and brought to the recovery room in stable condition.

## 2024-12-13 DIAGNOSIS — Z85.118 PERSONAL HISTORY OF OTHER MALIGNANT NEOPLASM OF BRONCHUS AND LUNG: ICD-10-CM

## 2024-12-13 DIAGNOSIS — I25.10 ATHEROSCLEROTIC HEART DISEASE OF NATIVE CORONARY ARTERY WITHOUT ANGINA PECTORIS: ICD-10-CM

## 2024-12-13 DIAGNOSIS — Z79.82 LONG TERM (CURRENT) USE OF ASPIRIN: ICD-10-CM

## 2024-12-13 DIAGNOSIS — K42.9 UMBILICAL HERNIA WITHOUT OBSTRUCTION OR GANGRENE: ICD-10-CM

## 2024-12-13 DIAGNOSIS — E78.00 PURE HYPERCHOLESTEROLEMIA, UNSPECIFIED: ICD-10-CM

## 2024-12-13 DIAGNOSIS — I10 ESSENTIAL (PRIMARY) HYPERTENSION: ICD-10-CM

## 2024-12-13 DIAGNOSIS — K40.20 BILATERAL INGUINAL HERNIA, WITHOUT OBSTRUCTION OR GANGRENE, NOT SPECIFIED AS RECURRENT: ICD-10-CM

## 2024-12-13 DIAGNOSIS — Z87.891 PERSONAL HISTORY OF NICOTINE DEPENDENCE: ICD-10-CM

## 2025-04-01 ENCOUNTER — RX ONLY (RX ONLY)
Age: 70
End: 2025-04-01

## 2025-04-01 ENCOUNTER — OFFICE (OUTPATIENT)
Dept: URBAN - METROPOLITAN AREA CLINIC 12 | Facility: CLINIC | Age: 70
Setting detail: OPHTHALMOLOGY
End: 2025-04-01
Payer: MEDICARE

## 2025-04-01 DIAGNOSIS — H25.13: ICD-10-CM

## 2025-04-01 DIAGNOSIS — H35.3111: ICD-10-CM

## 2025-04-01 DIAGNOSIS — B02.1: ICD-10-CM

## 2025-04-01 DIAGNOSIS — H16.223: ICD-10-CM

## 2025-04-01 DIAGNOSIS — H90.3: ICD-10-CM

## 2025-04-01 PROCEDURE — 92557 COMPREHENSIVE HEARING TEST: CPT | Mod: AB

## 2025-04-01 PROCEDURE — 92014 COMPRE OPH EXAM EST PT 1/>: CPT | Performed by: STUDENT IN AN ORGANIZED HEALTH CARE EDUCATION/TRAINING PROGRAM

## 2025-04-01 PROCEDURE — 92567 TYMPANOMETRY: CPT | Mod: AB

## 2025-04-01 PROCEDURE — 92250 FUNDUS PHOTOGRAPHY W/I&R: CPT | Performed by: STUDENT IN AN ORGANIZED HEALTH CARE EDUCATION/TRAINING PROGRAM

## 2025-04-01 ASSESSMENT — REFRACTION_MANIFEST
OS_VA1: 20/20
OS_SPHERE: +2.75
OS_CYLINDER: -1.75
OD_SPHERE: +2.00
OD_CYLINDER: -1.00
OS_AXIS: 085
OD_VA1: 20/20
OD_AXIS: 105

## 2025-04-01 ASSESSMENT — REFRACTION_AUTOREFRACTION
OD_SPHERE: +2.00
OS_SPHERE: +2.75
OS_AXIS: 085
OS_CYLINDER: -1.75
OD_CYLINDER: -1.00
OD_AXIS: 105

## 2025-04-01 ASSESSMENT — REFRACTION_CURRENTRX
OS_OVR_VA: 20/
OD_VPRISM_DIRECTION: SV
OS_VPRISM_DIRECTION: SV
OD_SPHERE: +2.75
OS_SPHERE: +2.75
OD_OVR_VA: 20/

## 2025-04-01 ASSESSMENT — SUPERFICIAL PUNCTATE KERATITIS (SPK)
OS_SPK: T
OD_SPK: T

## 2025-04-01 ASSESSMENT — TONOMETRY
OS_IOP_MMHG: 17
OD_IOP_MMHG: 16

## 2025-04-01 ASSESSMENT — CONFRONTATIONAL VISUAL FIELD TEST (CVF)
OS_FINDINGS: FULL
OD_FINDINGS: FULL

## 2025-04-01 ASSESSMENT — VISUAL ACUITY
OD_BCVA: 20/30
OS_BCVA: 20/50+2

## 2025-04-01 ASSESSMENT — KERATOMETRY
OD_K2POWER_DIOPTERS: 45.50
OS_K1POWER_DIOPTERS: 43.75
METHOD_AUTO_MANUAL: AUTO
OS_K2POWER_DIOPTERS: 45.25
OD_AXISANGLE_DEGREES: 013
OD_K1POWER_DIOPTERS: 44.75
OS_AXISANGLE_DEGREES: 172

## 2025-05-14 ENCOUNTER — APPOINTMENT (OUTPATIENT)
Dept: UROLOGY | Facility: CLINIC | Age: 70
End: 2025-05-14

## 2025-05-14 VITALS
WEIGHT: 190 LBS | BODY MASS INDEX: 28.14 KG/M2 | SYSTOLIC BLOOD PRESSURE: 134 MMHG | HEIGHT: 69 IN | DIASTOLIC BLOOD PRESSURE: 86 MMHG | RESPIRATION RATE: 16 BRPM | OXYGEN SATURATION: 98 % | HEART RATE: 72 BPM

## 2025-05-14 DIAGNOSIS — R97.20 ELEVATED PROSTATE, SPECIFIC ANTIGEN [PSA]: ICD-10-CM

## 2025-05-14 PROCEDURE — 51741 ELECTRO-UROFLOWMETRY FIRST: CPT

## 2025-05-14 PROCEDURE — 99214 OFFICE O/P EST MOD 30 MIN: CPT | Mod: 25
